# Patient Record
Sex: MALE | Race: BLACK OR AFRICAN AMERICAN | Employment: OTHER | URBAN - METROPOLITAN AREA
[De-identification: names, ages, dates, MRNs, and addresses within clinical notes are randomized per-mention and may not be internally consistent; named-entity substitution may affect disease eponyms.]

---

## 2023-09-21 ENCOUNTER — OFFICE VISIT (OUTPATIENT)
Age: 58
End: 2023-09-21
Payer: COMMERCIAL

## 2023-09-21 VITALS
HEART RATE: 74 BPM | DIASTOLIC BLOOD PRESSURE: 80 MMHG | RESPIRATION RATE: 18 BRPM | HEIGHT: 77 IN | SYSTOLIC BLOOD PRESSURE: 118 MMHG | OXYGEN SATURATION: 98 % | BODY MASS INDEX: 21.25 KG/M2 | WEIGHT: 180 LBS

## 2023-09-21 DIAGNOSIS — K40.90 UNILATERAL INGUINAL HERNIA WITHOUT OBSTRUCTION OR GANGRENE, RECURRENCE NOT SPECIFIED: Primary | ICD-10-CM

## 2023-09-21 DIAGNOSIS — R74.8 ELEVATED LIVER ENZYMES: ICD-10-CM

## 2023-09-21 DIAGNOSIS — R79.89 ABNORMAL CBC: ICD-10-CM

## 2023-09-21 PROCEDURE — 99203 OFFICE O/P NEW LOW 30 MIN: CPT | Performed by: INTERNAL MEDICINE

## 2023-09-21 RX ORDER — DILTIAZEM HYDROCHLORIDE 120 MG/1
120 CAPSULE, COATED, EXTENDED RELEASE ORAL DAILY
COMMUNITY
Start: 2023-08-23

## 2023-09-21 RX ORDER — PENICILLIN V POTASSIUM 500 MG/1
TABLET ORAL
COMMUNITY
Start: 2023-06-28

## 2023-09-21 RX ORDER — DILTIAZEM HYDROCHLORIDE 120 MG/1
120 TABLET, FILM COATED ORAL
COMMUNITY

## 2023-09-21 RX ORDER — LOSARTAN POTASSIUM 50 MG/1
TABLET ORAL
COMMUNITY
Start: 2023-09-20

## 2023-09-21 RX ORDER — METOPROLOL TARTRATE 50 MG/1
50 TABLET, FILM COATED ORAL 2 TIMES DAILY
COMMUNITY
Start: 2023-08-31

## 2023-09-21 RX ORDER — ASPIRIN 81 MG/1
81 TABLET ORAL
COMMUNITY

## 2023-09-21 RX ORDER — ZOLPIDEM TARTRATE 10 MG/1
TABLET ORAL
COMMUNITY
Start: 2023-08-31

## 2023-09-21 RX ORDER — PANTOPRAZOLE SODIUM 40 MG/1
TABLET, DELAYED RELEASE ORAL
COMMUNITY
Start: 2023-08-19

## 2023-09-21 RX ORDER — LORAZEPAM 0.5 MG/1
0.5 TABLET ORAL DAILY
COMMUNITY

## 2023-09-21 RX ORDER — CLOPIDOGREL BISULFATE 75 MG/1
75 TABLET ORAL
COMMUNITY

## 2023-09-21 NOTE — PROGRESS NOTES
[facility] - Outpatient Note  Mckay Salazar 66 y.o. male MRN: [id number]  Encounter: [id number]      ASSESSMENT AND PLAN:    Mckay Salazar is a 66 y.o. old pleasant male with PMH of gastric bypass in 3422 complicated by marginal ulcer, sleep apnea, atrial fibrillation status post Watchman (not on anticoagulation), hypertension, history of PUD who presents for consultation for hernia    Right-sided inguinal hernia-moderate-sized, reducible in the right inguinal region noted on physical exam.  No signs of incarceration. Intermittently has mild discomfort. · Will refer to general surgery Dr. Dinah Primrose for consideration of repair    Fatty liver, mild elevation of liver enzymes-noted on CAT scan last year at outside facility. No signs of cirrhosis on imaging labs or physical exam.  · Repeat liver enzymes  · Counseled on avoiding alcohol  · Fortunately patient is lost weight since this CAT scan  · Can consider ultrasound elastography in the future    History of marginal ulcer-this is first seen in 2014 and patient reports of bleeding ulcer last year. He was taken off his anticoagulation after Watchman was performed. He is otherwise stable with no signs of ulcer or bleeding. · Continue Protonix daily      Colon cancer screening-reports colonoscopy 2 years ago that was normal with repeat recommended in 2 years. · We will work to obtain his records      There are no diagnoses linked to this encounter.  ______________________________________________________________________    SUBJECTIVE: Patient states roughly 2 weeks ago he was lifting around the house doing chores and he felt a pop in his right inguinal region. He noticed a bulge that has intermittently been reducing. He has mild intermittent discomfort. This is never happened before. He otherwise denies any symptoms. No abdominal pain nausea vomiting constipation diarrhea. No fevers chills weight loss.   He has had no abdominal surgeries aside from his Nadege-en-Y gastric bypass. He inquires about surgical repair. His liver enzymes from 08/23/2023 show AST 39, ALT 35, alk phos 177 and T. bili 2.1  Patient had EGD in 2014 for PUD where his GJ anastomosis there was noted to be a 1 cm x 2 cm ulcer that is clean base. I reviewed prior external notes    I reviewed previous lab results and images      REVIEW OF SYSTEMS:     REVIEW OF ALL OTHER SYSTEMS IS OTHERWISE NEGATIVE. Historical Information   History reviewed. No pertinent past medical history. History reviewed. No pertinent surgical history. Social History   Social History     Substance and Sexual Activity   Alcohol Use Yes     Social History     Substance and Sexual Activity   Drug Use Not on file     Social History     Tobacco Use   Smoking Status Never   • Passive exposure: Never   Smokeless Tobacco Never     History reviewed. No pertinent family history. Meds/Allergies       Current Outpatient Medications:   •  aspirin (ECOTRIN LOW STRENGTH) 81 mg EC tablet  •  diltiazem (CARDIZEM CD) 120 mg 24 hr capsule  •  losartan (COZAAR) 50 mg tablet  •  metoprolol tartrate (LOPRESSOR) 50 mg tablet  •  pantoprazole (PROTONIX) 40 mg tablet  •  zolpidem (AMBIEN) 10 mg tablet  •  clopidogrel (PLAVIX) 75 mg tablet  •  diltiazem (CARDIZEM) 120 MG tablet  •  LORazepam (ATIVAN) 0.5 mg tablet  •  penicillin V potassium (VEETID) 500 mg tablet    No Known Allergies        Objective     Blood pressure 118/80, pulse 74, resp. rate 18, height 6' 5" (1.956 m), weight 81.6 kg (180 lb), SpO2 98 %. Body mass index is 21.34 kg/m². PHYSICAL EXAM:      General Appearance:   Alert, cooperative, no distress   HEENT:   Normocephalic, atraumatic, anicteric. Neck:  Supple, symmetrical, trachea midline   Lungs:   Clear to auscultation bilaterally; no rales, rhonchi or wheezing; respirations unlabored    Heart[de-identified]   Regular rate and rhythm; no murmur, rub, or gallop.    Abdomen:   Soft, non-tender, non-distended; normal bowel sounds; no masses, no organomegaly    Genitalia:   Deferred    Rectal:   Deferred    Extremities:  No cyanosis, clubbing or edema    Pulses:  2+ and symmetric    Skin:  No jaundice, rashes, or lesions    Lymph nodes:  No palpable cervical lymphadenopathy        Lab Results:   No visits with results within 1 Day(s) from this visit. Latest known visit with results is:   No results found for any previous visit. Radiology Results:   No results found.

## 2023-10-02 ENCOUNTER — CONSULT (OUTPATIENT)
Dept: SURGERY | Facility: CLINIC | Age: 58
End: 2023-10-02
Payer: COMMERCIAL

## 2023-10-02 VITALS
SYSTOLIC BLOOD PRESSURE: 121 MMHG | WEIGHT: 173.6 LBS | DIASTOLIC BLOOD PRESSURE: 86 MMHG | BODY MASS INDEX: 20.5 KG/M2 | TEMPERATURE: 97.4 F | HEIGHT: 77 IN

## 2023-10-02 DIAGNOSIS — K40.90 UNILATERAL INGUINAL HERNIA WITHOUT OBSTRUCTION OR GANGRENE, RECURRENCE NOT SPECIFIED: Primary | ICD-10-CM

## 2023-10-02 DIAGNOSIS — I48.91 A-FIB (HCC): ICD-10-CM

## 2023-10-02 DIAGNOSIS — Z76.89 ENCOUNTER TO ESTABLISH CARE: ICD-10-CM

## 2023-10-02 PROCEDURE — 99204 OFFICE O/P NEW MOD 45 MIN: CPT | Performed by: STUDENT IN AN ORGANIZED HEALTH CARE EDUCATION/TRAINING PROGRAM

## 2023-10-02 RX ORDER — CHLORHEXIDINE GLUCONATE 4 G/100ML
SOLUTION TOPICAL DAILY PRN
OUTPATIENT
Start: 2023-10-02

## 2023-10-02 RX ORDER — HEPARIN SODIUM 5000 [USP'U]/ML
5000 INJECTION, SOLUTION INTRAVENOUS; SUBCUTANEOUS ONCE
OUTPATIENT
Start: 2023-10-02 | End: 2023-10-02

## 2023-10-02 NOTE — PROGRESS NOTES
Assessment/Plan:  53-year-old male with reducible right inguinal hernia  -Patient presents due to right groin pain and bulge  -He recently moved to the area and noticed a twinge of pain in his right groin when moving  -Since that time he noticed a bulge that has slowly increased in size  -When he strains or goes to lift the bulge comes out more which causes him discomfort  -Patient also states he has been having some intermittent left groin twinges of pain no obvious bulge  -On exam there is a reducible right inguinal hernia, no obvious inguinal hernia on the left  -CBC and CMP from 8/23/2023 reviewed, this is in [address] [facility]  -CT scan of the abdomen and pelvis from 11/17/2022 report reviewed, this is in Care Everywhere  -Cardiac catheterization report from 2/8/2023 report reviewed, this is in [address] [facility]  -Cardiology note from 6/15/2023 reviewed, this is in [address] [facility] for laparoscopic right inguinal hernia repair with mesh, possible open, possible bilateral  -CBC, BMP ordered  -EKG ordered  -Request Cardiology risk stratification and optimization, at this time would recommend we obtain this from his [facility] Cardiologist since he recently underwent the Watchman procedure  -Referral placed for Family medicine to establish care  -Referral placed for Cardiology to establish care  -Patient notes he had a small amount of blood in his stool which does occur to him from time to time, recommend he follow-up with Gastroenterology for reevaluation    A visual was used to display the anatomy and the proposed incision, procedure, steps, and mesh placement. The risks were explained at length and outlined, not limited to bleeding, infection, recurrence, pain, testicular loss, and damage to other structures. The planned approximately one hour procedure was discussed along with 1 - 2 week recovery, resolution of pain and swelling timeline, and 4 weeks of light duty without lifting.  Questions were answered to satisfaction and consent was signed. 1. Unilateral inguinal hernia without obstruction or gangrene, recurrence not specified  -     Ambulatory Referral to General Surgery  -     Case request operating room: REPAIR HERNIA INGUINAL, LAPAROSCOPIC, Possible Open, Possible Bilateral; Standing  -     Basic metabolic panel; Future  -     CBC and Platelet; Future  -     EKG 12 lead; Future  -     Case request operating room: REPAIR HERNIA INGUINAL, LAPAROSCOPIC, Possible Open, Possible Bilateral    2. Encounter to establish care  -     Ambulatory Referral to [facility]; Future    3. [facility])  -     Ambulatory Referral to Cardiology; Future               Subjective:      Patient ID: Zack Galicia is a 66 y.o. male. Triage Notes:    12year-old male who presents the office for evaluation due to left groin pain and bulge. Patient has recently moved to the area and when he was moving boxes he noticed some discomfort in his right groin. Since that time he has noticed a bulge in his right groin that is slowly increased in size. He notes that it bulges out more when doing strenuous activity. He has been tolerating a diet and having bowel function. He states that he has noticed a small amount of blood in his stool which does occur to him from time to time. The following portions of the patient's history were reviewed and updated as appropriate:   He  has no past medical history on file. He   Patient Active Problem List    Diagnosis Date Noted   • Unilateral inguinal hernia without obstruction or gangrene 10/02/2023   • Encounter to establish care 10/02/2023   • [facility]) 10/02/2023     He  has no past surgical history on file. His family history is not on file. He  reports that he has never smoked. He has never been exposed to tobacco smoke. He has never used smokeless tobacco. He reports current alcohol use. He reports that he does not use drugs.   Current Outpatient Medications on File Prior to Visit Medication Sig   • aspirin (ECOTRIN LOW STRENGTH) 81 mg EC tablet Take 81 mg by mouth   • diltiazem (CARDIZEM CD) 120 mg 24 hr capsule Take 120 mg by mouth daily   • losartan (COZAAR) 50 mg tablet    • metoprolol tartrate (LOPRESSOR) 50 mg tablet Take 50 mg by mouth 2 (two) times a day   • pantoprazole (PROTONIX) 40 mg tablet    • zolpidem (AMBIEN) 10 mg tablet TAKE 1 TABLET BY MOUTH EVERY EVENING AS NEEDED FOR SLEEP   • clopidogrel (PLAVIX) 75 mg tablet Take 75 mg by mouth (Patient not taking: Reported on 9/21/2023)   • diltiazem (CARDIZEM) 120 MG tablet Take 120 mg by mouth (Patient not taking: Reported on 9/21/2023)   • LORazepam (ATIVAN) 0.5 mg tablet Take 0.5 mg by mouth daily (Patient not taking: Reported on 9/21/2023)   • penicillin V potassium (VEETID) 500 mg tablet  (Patient not taking: Reported on 9/21/2023)     No current facility-administered medications on file prior to visit. He has No Known Allergies. .    Review of Systems   Constitutional: Negative for chills, fatigue and fever. HENT: Negative for congestion, hearing loss, rhinorrhea and sore throat. Eyes: Negative for pain and discharge. Respiratory: Negative for cough, chest tightness and shortness of breath. Cardiovascular: Negative for chest pain and palpitations. Gastrointestinal: Positive for blood in stool. Negative for abdominal pain, constipation, diarrhea, nausea and vomiting. Positive right groin pain and bulge   Endocrine: Negative for cold intolerance and heat intolerance. Genitourinary: Negative for difficulty urinating and dysuria. Musculoskeletal: Negative for back pain and neck pain. Skin: Negative for color change and rash. Allergic/Immunologic: Negative for environmental allergies and food allergies. Neurological: Negative for seizures and headaches. Hematological: Negative for adenopathy. Does not bruise/bleed easily. Psychiatric/Behavioral: Negative for confusion and hallucinations. Objective:      /86 (BP Location: Left arm, Patient Position: Sitting, Cuff Size: Standard)   Temp (!) 97.4 °F (36.3 °C) (Temporal)   Ht 6' 5" (1.956 m)   Wt 78.7 kg (173 lb 9.6 oz)   BMI 20.59 kg/m²     Below is the patient's most recent value for Albumin, ALT, AST, BUN, Calcium, Chloride, Cholesterol, CO2, Creatinine, GFR, Glucose, HDL, Hematocrit, Hemoglobin, Hemoglobin A1C, LDL, Magnesium, Phosphorus, Platelets, Potassium, PSA, Sodium, Triglycerides, and WBC. No results found for: "ALT", "AST", "BUN", "CALCIUM", "CL", "CHOL", "CO2", "CREATININE", "CREAT", "GFRAA", "[place]", "HDL", "HCT", "HGB", "HGBA1C", "LDL", "MG", "PHOS", "PLT", "K", "PSA", "NA", "TRIG", "WBC"  Note: for a comprehensive list of the patient's lab results, access the Results Review activity. Physical Exam  Constitutional:       Appearance: Normal appearance. HENT:      Head: Normocephalic and atraumatic. Nose: Nose normal.   Eyes:      General: No scleral icterus. Conjunctiva/sclera: Conjunctivae normal.   Cardiovascular:      Rate and Rhythm: Normal rate and regular rhythm. Heart sounds: Normal heart sounds. Pulmonary:      Effort: Pulmonary effort is normal.      Breath sounds: Normal breath sounds. Abdominal:      General: There is no distension. Palpations: Abdomen is soft. Tenderness: There is no abdominal tenderness. Comments: Reducible right inguinal hernia, no obvious inguinal hernia on the left, patient examined in both standing and supine position   Musculoskeletal:         General: No signs of injury. Skin:     General: Skin is warm. Coloration: Skin is not jaundiced. Neurological:      General: No focal deficit present. Mental Status: He is alert and oriented to person, place, and time.    Psychiatric:         Mood and Affect: Mood normal.         Behavior: Behavior normal.

## 2023-10-02 NOTE — H&P (VIEW-ONLY)
Assessment/Plan:  61-year-old male with reducible right inguinal hernia  -Patient presents due to right groin pain and bulge  -He recently moved to the area and noticed a twinge of pain in his right groin when moving  -Since that time he noticed a bulge that has slowly increased in size  -When he strains or goes to lift the bulge comes out more which causes him discomfort  -Patient also states he has been having some intermittent left groin twinges of pain no obvious bulge  -On exam there is a reducible right inguinal hernia, no obvious inguinal hernia on the left  -CBC and CMP from 8/23/2023 reviewed, this is in [address] [facility]  -CT scan of the abdomen and pelvis from 11/17/2022 report reviewed, this is in Care Everywhere  -Cardiac catheterization report from 2/8/2023 report reviewed, this is in [address] [facility]  -Cardiology note from 6/15/2023 reviewed, this is in [address] [facility] for laparoscopic right inguinal hernia repair with mesh, possible open, possible bilateral  -CBC, BMP ordered  -EKG ordered  -Request Cardiology risk stratification and optimization, at this time would recommend we obtain this from his [facility] Cardiologist since he recently underwent the Watchman procedure  -Referral placed for Family medicine to establish care  -Referral placed for Cardiology to establish care  -Patient notes he had a small amount of blood in his stool which does occur to him from time to time, recommend he follow-up with Gastroenterology for reevaluation    A visual was used to display the anatomy and the proposed incision, procedure, steps, and mesh placement. The risks were explained at length and outlined, not limited to bleeding, infection, recurrence, pain, testicular loss, and damage to other structures. The planned approximately one hour procedure was discussed along with 1 - 2 week recovery, resolution of pain and swelling timeline, and 4 weeks of light duty without lifting.  Questions were answered to satisfaction and consent was signed. 1. Unilateral inguinal hernia without obstruction or gangrene, recurrence not specified  -     Ambulatory Referral to General Surgery  -     Case request operating room: REPAIR HERNIA INGUINAL, LAPAROSCOPIC, Possible Open, Possible Bilateral; Standing  -     Basic metabolic panel; Future  -     CBC and Platelet; Future  -     EKG 12 lead; Future  -     Case request operating room: REPAIR HERNIA INGUINAL, LAPAROSCOPIC, Possible Open, Possible Bilateral    2. Encounter to establish care  -     Ambulatory Referral to [facility]; Future    3. [facility])  -     Ambulatory Referral to Cardiology; Future               Subjective:      Patient ID: Gume Pickett is a 66 y.o. male. Triage Notes:    12year-old male who presents the office for evaluation due to left groin pain and bulge. Patient has recently moved to the area and when he was moving boxes he noticed some discomfort in his right groin. Since that time he has noticed a bulge in his right groin that is slowly increased in size. He notes that it bulges out more when doing strenuous activity. He has been tolerating a diet and having bowel function. He states that he has noticed a small amount of blood in his stool which does occur to him from time to time. The following portions of the patient's history were reviewed and updated as appropriate:   He  has no past medical history on file. He   Patient Active Problem List    Diagnosis Date Noted   • Unilateral inguinal hernia without obstruction or gangrene 10/02/2023   • Encounter to establish care 10/02/2023   • [facility]) 10/02/2023     He  has no past surgical history on file. His family history is not on file. He  reports that he has never smoked. He has never been exposed to tobacco smoke. He has never used smokeless tobacco. He reports current alcohol use. He reports that he does not use drugs.   Current Outpatient Medications on File Prior to Visit Medication Sig   • aspirin (ECOTRIN LOW STRENGTH) 81 mg EC tablet Take 81 mg by mouth   • diltiazem (CARDIZEM CD) 120 mg 24 hr capsule Take 120 mg by mouth daily   • losartan (COZAAR) 50 mg tablet    • metoprolol tartrate (LOPRESSOR) 50 mg tablet Take 50 mg by mouth 2 (two) times a day   • pantoprazole (PROTONIX) 40 mg tablet    • zolpidem (AMBIEN) 10 mg tablet TAKE 1 TABLET BY MOUTH EVERY EVENING AS NEEDED FOR SLEEP   • clopidogrel (PLAVIX) 75 mg tablet Take 75 mg by mouth (Patient not taking: Reported on 9/21/2023)   • diltiazem (CARDIZEM) 120 MG tablet Take 120 mg by mouth (Patient not taking: Reported on 9/21/2023)   • LORazepam (ATIVAN) 0.5 mg tablet Take 0.5 mg by mouth daily (Patient not taking: Reported on 9/21/2023)   • penicillin V potassium (VEETID) 500 mg tablet  (Patient not taking: Reported on 9/21/2023)     No current facility-administered medications on file prior to visit. He has No Known Allergies. .    Review of Systems   Constitutional: Negative for chills, fatigue and fever. HENT: Negative for congestion, hearing loss, rhinorrhea and sore throat. Eyes: Negative for pain and discharge. Respiratory: Negative for cough, chest tightness and shortness of breath. Cardiovascular: Negative for chest pain and palpitations. Gastrointestinal: Positive for blood in stool. Negative for abdominal pain, constipation, diarrhea, nausea and vomiting. Positive right groin pain and bulge   Endocrine: Negative for cold intolerance and heat intolerance. Genitourinary: Negative for difficulty urinating and dysuria. Musculoskeletal: Negative for back pain and neck pain. Skin: Negative for color change and rash. Allergic/Immunologic: Negative for environmental allergies and food allergies. Neurological: Negative for seizures and headaches. Hematological: Negative for adenopathy. Does not bruise/bleed easily. Psychiatric/Behavioral: Negative for confusion and hallucinations. Objective:      /86 (BP Location: Left arm, Patient Position: Sitting, Cuff Size: Standard)   Temp (!) 97.4 °F (36.3 °C) (Temporal)   Ht 6' 5" (1.956 m)   Wt 78.7 kg (173 lb 9.6 oz)   BMI 20.59 kg/m²     Below is the patient's most recent value for Albumin, ALT, AST, BUN, Calcium, Chloride, Cholesterol, CO2, Creatinine, GFR, Glucose, HDL, Hematocrit, Hemoglobin, Hemoglobin A1C, LDL, Magnesium, Phosphorus, Platelets, Potassium, PSA, Sodium, Triglycerides, and WBC. No results found for: "ALT", "AST", "BUN", "CALCIUM", "CL", "CHOL", "CO2", "CREATININE", "CREAT", "GFRAA", "[employer]", "HDL", "HCT", "HGB", "HGBA1C", "LDL", "MG", "PHOS", "PLT", "K", "PSA", "NA", "TRIG", "WBC"  Note: for a comprehensive list of the patient's lab results, access the Results Review activity. Physical Exam  Constitutional:       Appearance: Normal appearance. HENT:      Head: Normocephalic and atraumatic. Nose: Nose normal.   Eyes:      General: No scleral icterus. Conjunctiva/sclera: Conjunctivae normal.   Cardiovascular:      Rate and Rhythm: Normal rate and regular rhythm. Heart sounds: Normal heart sounds. Pulmonary:      Effort: Pulmonary effort is normal.      Breath sounds: Normal breath sounds. Abdominal:      General: There is no distension. Palpations: Abdomen is soft. Tenderness: There is no abdominal tenderness. Comments: Reducible right inguinal hernia, no obvious inguinal hernia on the left, patient examined in both standing and supine position   Musculoskeletal:         General: No signs of injury. Skin:     General: Skin is warm. Coloration: Skin is not jaundiced. Neurological:      General: No focal deficit present. Mental Status: He is alert and oriented to person, place, and time.    Psychiatric:         Mood and Affect: Mood normal.         Behavior: Behavior normal.

## 2023-10-04 ENCOUNTER — OFFICE VISIT (OUTPATIENT)
Dept: LAB | Facility: HOSPITAL | Age: 58
End: 2023-10-04
Payer: COMMERCIAL

## 2023-10-04 ENCOUNTER — APPOINTMENT (OUTPATIENT)
Dept: LAB | Facility: HOSPITAL | Age: 58
End: 2023-10-04
Payer: COMMERCIAL

## 2023-10-04 DIAGNOSIS — K40.90 UNILATERAL INGUINAL HERNIA WITHOUT OBSTRUCTION OR GANGRENE, RECURRENCE NOT SPECIFIED: ICD-10-CM

## 2023-10-04 LAB
ANION GAP SERPL CALCULATED.3IONS-SCNC: 5 MMOL/L
ATRIAL RATE: 80 BPM
BUN SERPL-MCNC: 13 MG/DL (ref 5–25)
CALCIUM SERPL-MCNC: 8.8 MG/DL (ref 8.4–10.2)
CHLORIDE SERPL-SCNC: 108 MMOL/L (ref 96–108)
CO2 SERPL-SCNC: 31 MMOL/L (ref 21–32)
CREAT SERPL-MCNC: 1.3 MG/DL (ref 0.6–1.3)
ERYTHROCYTE [DISTWIDTH] IN BLOOD BY AUTOMATED COUNT: 13.2 % (ref 11.6–15.1)
GFR SERPL CREATININE-BSD FRML MDRD: 60 ML/MIN/1.73SQ M
GLUCOSE P FAST SERPL-MCNC: 102 MG/DL (ref 65–99)
HCT VFR BLD AUTO: 38.7 % (ref 36.5–49.3)
HGB BLD-MCNC: 12.6 G/DL (ref 12–17)
MCH RBC QN AUTO: 32.4 PG (ref 26.8–34.3)
MCHC RBC AUTO-ENTMCNC: 32.6 G/DL (ref 31.4–37.4)
MCV RBC AUTO: 100 FL (ref 82–98)
P AXIS: 66 DEGREES
PLATELET # BLD AUTO: 255 THOUSANDS/UL (ref 149–390)
PMV BLD AUTO: 10 FL (ref 8.9–12.7)
POTASSIUM SERPL-SCNC: 5.3 MMOL/L (ref 3.5–5.3)
PR INTERVAL: 140 MS
QRS AXIS: 42 DEGREES
QRSD INTERVAL: 80 MS
QT INTERVAL: 390 MS
QTC INTERVAL: 449 MS
RBC # BLD AUTO: 3.89 MILLION/UL (ref 3.88–5.62)
SODIUM SERPL-SCNC: 144 MMOL/L (ref 135–147)
T WAVE AXIS: 50 DEGREES
VENTRICULAR RATE: 80 BPM
WBC # BLD AUTO: 3.91 THOUSAND/UL (ref 4.31–10.16)

## 2023-10-04 PROCEDURE — 36415 COLL VENOUS BLD VENIPUNCTURE: CPT

## 2023-10-04 PROCEDURE — 93010 ELECTROCARDIOGRAM REPORT: CPT | Performed by: INTERNAL MEDICINE

## 2023-10-04 PROCEDURE — 93005 ELECTROCARDIOGRAM TRACING: CPT

## 2023-10-04 PROCEDURE — 80048 BASIC METABOLIC PNL TOTAL CA: CPT

## 2023-10-04 PROCEDURE — 85027 COMPLETE CBC AUTOMATED: CPT

## 2023-10-06 ENCOUNTER — TELEPHONE (OUTPATIENT)
Dept: SURGERY | Facility: CLINIC | Age: 58
End: 2023-10-06

## 2023-10-06 NOTE — TELEPHONE ENCOUNTER
Spoke to patient stated I called his Cardiology office still no answer on if he is cleared for surgery and sent the clearance form to that office and haven't gotten word if patient is cleared. Patient stated he will call his Cardiology doctor on Monday to see what is going on and will call us back.

## 2023-10-06 NOTE — PRE-PROCEDURE INSTRUCTIONS
Pre-Surgery Instructions:   Medication Instructions   • aspirin (ECOTRIN LOW STRENGTH) 81 mg EC tablet Hold day of procedure as per surgeon   • diltiazem (CARDIZEM CD) 120 mg 24 hr capsule Take day of surgery. • losartan (COZAAR) 50 mg tablet Hold day of surgery. • metoprolol tartrate (LOPRESSOR) 50 mg tablet Take day of surgery. • pantoprazole (PROTONIX) 40 mg tablet Take day of surgery. • zolpidem (AMBIEN) 10 mg tablet Take night before surgery      Medication instructions for day surgery reviewed. Please use only a sip of water to take your instructed medications. Avoid all over the counter vitamins, supplements and NSAIDS for one week prior to surgery per anesthesia guidelines. Tylenol is ok to take as needed. You will receive a call one business day prior to surgery with an arrival time and hospital directions. If your surgery is scheduled on a Monday, the hospital will be calling you on the Friday prior to your surgery. If you have not heard from anyone by 8pm, please call the hospital supervisor through the hospital  at [phone]. Thor Paul [phone]). Do not eat or drink anything after midnight the night before your surgery, including candy, mints, lifesavers, or chewing gum. Do not drink alcohol 24hrs before your surgery. Try not to smoke at least 24hrs before your surgery. Follow the pre surgery showering instructions as listed in the [facility] Surgical Experience Booklet” or otherwise provided by your surgeon's office. Do not shave the surgical area 24 hours before surgery. Do not apply any lotions, creams, including makeup, cologne, deodorant, or perfumes after showering on the day of your surgery. No contact lenses, eye make-up, or artificial eyelashes. Remove nail polish, including gel polish, and any artificial, gel, or acrylic nails if possible. Remove all jewelry including rings and body piercing jewelry. Wear causal clothing that is easy to take on and off. Consider your type of surgery. Keep any valuables, jewelry, piercings at home. Please bring any specially ordered equipment (sling, braces) if indicated. Arrange for a responsible person to drive you to and from the hospital on the day of your surgery. Visitor Guidelines discussed. Call the surgeon's office with any new illnesses, exposures, or additional questions prior to surgery. Please reference your [facility] Surgical Experience Booklet” for additional information to prepare for your upcoming surgery.

## 2023-10-10 ENCOUNTER — TELEPHONE (OUTPATIENT)
Dept: SURGERY | Facility: CLINIC | Age: 58
End: 2023-10-10

## 2023-10-10 NOTE — TELEPHONE ENCOUNTER
Patient called back and I informed him that the clearance is required. I asked him to please f/u with the cardiologist office as well. I gave him our direct fax number for them to fax the clearance form. He stated he would give them a call. I told him I will f/u on his call to them as well and hopefully we can get the clearance in on time.

## 2023-10-10 NOTE — TELEPHONE ENCOUNTER
I called back and spoke with Elton Johnson at the cardiologist office on 10/10/23. She was very rude and short however stated the NP will review the patients chart who was last seen 06/2023 and if she can clear him she will if not have him come back into the office. I will call back to f/u.

## 2023-10-13 ENCOUNTER — TELEPHONE (OUTPATIENT)
Dept: SURGERY | Facility: CLINIC | Age: 58
End: 2023-10-13

## 2023-10-13 NOTE — TELEPHONE ENCOUNTER
Spoke to patient's cardiology office with Kaila Mendosa [phone] so they can fax over clearance again on patient's surgery that will be on the 18th of October.

## 2023-10-18 ENCOUNTER — HOSPITAL ENCOUNTER (OUTPATIENT)
Facility: HOSPITAL | Age: 58
Setting detail: OUTPATIENT SURGERY
Discharge: HOME | End: 2023-10-18
Attending: STUDENT IN AN ORGANIZED HEALTH CARE EDUCATION/TRAINING PROGRAM | Admitting: STUDENT IN AN ORGANIZED HEALTH CARE EDUCATION/TRAINING PROGRAM
Payer: COMMERCIAL

## 2023-10-18 ENCOUNTER — ANESTHESIA EVENT (OUTPATIENT)
Dept: PERIOP | Facility: HOSPITAL | Age: 58
End: 2023-10-18
Payer: COMMERCIAL

## 2023-10-18 ENCOUNTER — ANESTHESIA (OUTPATIENT)
Dept: PERIOP | Facility: HOSPITAL | Age: 58
End: 2023-10-18
Payer: COMMERCIAL

## 2023-10-18 VITALS
HEIGHT: 77 IN | HEART RATE: 85 BPM | TEMPERATURE: 97.9 F | SYSTOLIC BLOOD PRESSURE: 125 MMHG | BODY MASS INDEX: 20.04 KG/M2 | DIASTOLIC BLOOD PRESSURE: 81 MMHG | OXYGEN SATURATION: 100 % | WEIGHT: 169.75 LBS | RESPIRATION RATE: 17 BRPM

## 2023-10-18 DIAGNOSIS — K40.90 UNILATERAL INGUINAL HERNIA WITHOUT OBSTRUCTION OR GANGRENE, RECURRENCE NOT SPECIFIED: Primary | ICD-10-CM

## 2023-10-18 PROBLEM — G47.33 OBSTRUCTIVE SLEEP APNEA SYNDROME: Status: ACTIVE | Noted: 2023-10-18

## 2023-10-18 PROBLEM — M10.9 GOUT: Status: ACTIVE | Noted: 2023-10-18

## 2023-10-18 PROBLEM — I10 HTN (HYPERTENSION): Status: ACTIVE | Noted: 2023-10-18

## 2023-10-18 PROBLEM — Z98.84 H/O BARIATRIC SURGERY: Status: ACTIVE | Noted: 2023-10-18

## 2023-10-18 PROCEDURE — 49650 LAP ING HERNIA REPAIR INIT: CPT | Performed by: STUDENT IN AN ORGANIZED HEALTH CARE EDUCATION/TRAINING PROGRAM

## 2023-10-18 PROCEDURE — C1781 MESH (IMPLANTABLE): HCPCS | Performed by: STUDENT IN AN ORGANIZED HEALTH CARE EDUCATION/TRAINING PROGRAM

## 2023-10-18 PROCEDURE — 49650 LAP ING HERNIA REPAIR INIT: CPT | Performed by: PHYSICIAN ASSISTANT

## 2023-10-18 DEVICE — LAPAROSCOPIC SELF-FIXATING MESH POLYESTER WITH POLYLACTIC ACID GRIPS AND COLLAGEN FILM
Type: IMPLANTABLE DEVICE | Site: INGUINAL | Status: FUNCTIONAL
Brand: PROGRIP

## 2023-10-18 RX ORDER — KETOROLAC TROMETHAMINE 30 MG/ML
INJECTION, SOLUTION INTRAMUSCULAR; INTRAVENOUS AS NEEDED
Status: DISCONTINUED | OUTPATIENT
Start: 2023-10-18 | End: 2023-10-18

## 2023-10-18 RX ORDER — ACETAMINOPHEN 325 MG/1
650 TABLET ORAL EVERY 6 HOURS PRN
Status: DISCONTINUED | OUTPATIENT
Start: 2023-10-18 | End: 2023-10-19 | Stop reason: HOSPADM

## 2023-10-18 RX ORDER — TRAMADOL HYDROCHLORIDE 50 MG/1
50 TABLET ORAL EVERY 6 HOURS PRN
Qty: 16 TABLET | Refills: 0 | Status: SHIPPED | OUTPATIENT
Start: 2023-10-18 | End: 2023-10-28

## 2023-10-18 RX ORDER — HYDROMORPHONE HCL/PF 1 MG/ML
0.5 SYRINGE (ML) INJECTION
OUTPATIENT
Start: 2023-10-18

## 2023-10-18 RX ORDER — PROPOFOL 10 MG/ML
INJECTION, EMULSION INTRAVENOUS AS NEEDED
Status: DISCONTINUED | OUTPATIENT
Start: 2023-10-18 | End: 2023-10-18

## 2023-10-18 RX ORDER — TRAMADOL HYDROCHLORIDE 50 MG/1
50 TABLET ORAL EVERY 6 HOURS PRN
Status: DISCONTINUED | OUTPATIENT
Start: 2023-10-18 | End: 2023-10-19 | Stop reason: HOSPADM

## 2023-10-18 RX ORDER — CEFAZOLIN SODIUM 1 G/50ML
1000 SOLUTION INTRAVENOUS ONCE
Status: COMPLETED | OUTPATIENT
Start: 2023-10-18 | End: 2023-10-18

## 2023-10-18 RX ORDER — BUPIVACAINE HYDROCHLORIDE 2.5 MG/ML
INJECTION, SOLUTION EPIDURAL; INFILTRATION; INTRACAUDAL AS NEEDED
Status: DISCONTINUED | OUTPATIENT
Start: 2023-10-18 | End: 2023-10-18 | Stop reason: HOSPADM

## 2023-10-18 RX ORDER — HYDROMORPHONE HCL/PF 1 MG/ML
SYRINGE (ML) INJECTION AS NEEDED
Status: DISCONTINUED | OUTPATIENT
Start: 2023-10-18 | End: 2023-10-18

## 2023-10-18 RX ORDER — SODIUM CHLORIDE, SODIUM LACTATE, POTASSIUM CHLORIDE, CALCIUM CHLORIDE 600; 310; 30; 20 MG/100ML; MG/100ML; MG/100ML; MG/100ML
125 INJECTION, SOLUTION INTRAVENOUS CONTINUOUS
Status: DISCONTINUED | OUTPATIENT
Start: 2023-10-18 | End: 2023-10-19 | Stop reason: HOSPADM

## 2023-10-18 RX ORDER — ONDANSETRON 2 MG/ML
INJECTION INTRAMUSCULAR; INTRAVENOUS AS NEEDED
Status: DISCONTINUED | OUTPATIENT
Start: 2023-10-18 | End: 2023-10-18

## 2023-10-18 RX ORDER — NEOSTIGMINE METHYLSULFATE 1 MG/ML
INJECTION INTRAVENOUS AS NEEDED
Status: DISCONTINUED | OUTPATIENT
Start: 2023-10-18 | End: 2023-10-18

## 2023-10-18 RX ORDER — LIDOCAINE HYDROCHLORIDE 10 MG/ML
INJECTION, SOLUTION EPIDURAL; INFILTRATION; INTRACAUDAL; PERINEURAL AS NEEDED
Status: DISCONTINUED | OUTPATIENT
Start: 2023-10-18 | End: 2023-10-18

## 2023-10-18 RX ORDER — MAGNESIUM HYDROXIDE 1200 MG/15ML
LIQUID ORAL AS NEEDED
Status: DISCONTINUED | OUTPATIENT
Start: 2023-10-18 | End: 2023-10-18 | Stop reason: HOSPADM

## 2023-10-18 RX ORDER — FENTANYL CITRATE/PF 50 MCG/ML
50 SYRINGE (ML) INJECTION
OUTPATIENT
Start: 2023-10-18

## 2023-10-18 RX ORDER — MIDAZOLAM HYDROCHLORIDE 2 MG/2ML
INJECTION, SOLUTION INTRAMUSCULAR; INTRAVENOUS AS NEEDED
Status: DISCONTINUED | OUTPATIENT
Start: 2023-10-18 | End: 2023-10-18

## 2023-10-18 RX ORDER — ONDANSETRON 2 MG/ML
4 INJECTION INTRAMUSCULAR; INTRAVENOUS ONCE AS NEEDED
OUTPATIENT
Start: 2023-10-18

## 2023-10-18 RX ORDER — FENTANYL CITRATE 50 UG/ML
INJECTION, SOLUTION INTRAMUSCULAR; INTRAVENOUS AS NEEDED
Status: DISCONTINUED | OUTPATIENT
Start: 2023-10-18 | End: 2023-10-18

## 2023-10-18 RX ORDER — CHLORHEXIDINE GLUCONATE 4 G/100ML
SOLUTION TOPICAL DAILY PRN
Status: DISCONTINUED | OUTPATIENT
Start: 2023-10-18 | End: 2023-10-19 | Stop reason: HOSPADM

## 2023-10-18 RX ORDER — HEPARIN SODIUM 5000 [USP'U]/ML
5000 INJECTION, SOLUTION INTRAVENOUS; SUBCUTANEOUS ONCE
Status: COMPLETED | OUTPATIENT
Start: 2023-10-18 | End: 2023-10-18

## 2023-10-18 RX ORDER — ROCURONIUM BROMIDE 10 MG/ML
INJECTION, SOLUTION INTRAVENOUS AS NEEDED
Status: DISCONTINUED | OUTPATIENT
Start: 2023-10-18 | End: 2023-10-18

## 2023-10-18 RX ORDER — DEXAMETHASONE SODIUM PHOSPHATE 10 MG/ML
INJECTION, SOLUTION INTRAMUSCULAR; INTRAVENOUS AS NEEDED
Status: DISCONTINUED | OUTPATIENT
Start: 2023-10-18 | End: 2023-10-18

## 2023-10-18 RX ORDER — GLYCOPYRROLATE 0.2 MG/ML
INJECTION INTRAMUSCULAR; INTRAVENOUS AS NEEDED
Status: DISCONTINUED | OUTPATIENT
Start: 2023-10-18 | End: 2023-10-18

## 2023-10-18 RX ORDER — DOCUSATE SODIUM 100 MG/1
100 CAPSULE, LIQUID FILLED ORAL 3 TIMES DAILY PRN
Qty: 30 CAPSULE | Refills: 0 | Status: SHIPPED | OUTPATIENT
Start: 2023-10-18

## 2023-10-18 RX ADMIN — FENTANYL CITRATE 75 MCG: 50 INJECTION INTRAMUSCULAR; INTRAVENOUS at 12:11

## 2023-10-18 RX ADMIN — NEOSTIGMINE METHYLSULFATE 4 MG: 1 INJECTION INTRAVENOUS at 13:21

## 2023-10-18 RX ADMIN — HEPARIN SODIUM 5000 UNITS: 5000 INJECTION INTRAVENOUS; SUBCUTANEOUS at 11:41

## 2023-10-18 RX ADMIN — ONDANSETRON 4 MG: 2 INJECTION INTRAMUSCULAR; INTRAVENOUS at 13:15

## 2023-10-18 RX ADMIN — SODIUM CHLORIDE, SODIUM LACTATE, POTASSIUM CHLORIDE, AND CALCIUM CHLORIDE 125 ML/HR: .6; .31; .03; .02 INJECTION, SOLUTION INTRAVENOUS at 11:39

## 2023-10-18 RX ADMIN — LIDOCAINE HYDROCHLORIDE 50 MG: 10 INJECTION, SOLUTION EPIDURAL; INFILTRATION; INTRACAUDAL at 12:11

## 2023-10-18 RX ADMIN — KETOROLAC TROMETHAMINE 30 MG: 30 INJECTION, SOLUTION INTRAMUSCULAR; INTRAVENOUS at 13:15

## 2023-10-18 RX ADMIN — MIDAZOLAM 2 MG: 1 INJECTION INTRAMUSCULAR; INTRAVENOUS at 12:06

## 2023-10-18 RX ADMIN — HYDROMORPHONE HYDROCHLORIDE 0.25 MG: 1 INJECTION, SOLUTION INTRAMUSCULAR; INTRAVENOUS; SUBCUTANEOUS at 13:27

## 2023-10-18 RX ADMIN — PROPOFOL 180 MG: 10 INJECTION, EMULSION INTRAVENOUS at 12:11

## 2023-10-18 RX ADMIN — ROCURONIUM BROMIDE 10 MG: 10 INJECTION, SOLUTION INTRAVENOUS at 12:58

## 2023-10-18 RX ADMIN — FENTANYL CITRATE 25 MCG: 50 INJECTION INTRAMUSCULAR; INTRAVENOUS at 12:41

## 2023-10-18 RX ADMIN — GLYCOPYRROLATE 0.8 MG: 0.2 INJECTION, SOLUTION INTRAMUSCULAR; INTRAVENOUS at 13:21

## 2023-10-18 RX ADMIN — CEFAZOLIN SODIUM 2000 MG: 1 SOLUTION INTRAVENOUS at 12:25

## 2023-10-18 RX ADMIN — DEXAMETHASONE SODIUM PHOSPHATE 10 MG: 10 INJECTION, SOLUTION INTRAMUSCULAR; INTRAVENOUS at 12:12

## 2023-10-18 RX ADMIN — ROCURONIUM BROMIDE 40 MG: 10 INJECTION, SOLUTION INTRAVENOUS at 12:12

## 2023-10-18 NOTE — DISCHARGE INSTR - AVS FIRST PAGE
Your incisions are covered with Steri-Strips, 4 x 4 gauze, Tegaderm. In 2 days you may remove your dressing, the Steri-Strips will remain on your skin but the 4 x 4 gauze and Tegaderm can be removed. The Steri-Strips will fall off on their own. After your dressings are removed you may shower. Do not soak your incisions including tub baths or swimming. You may shower and let water and soap wash over incisions. Do not scrub your incisions. No heavy lifting greater than 15 lb for 4 weeks or until cleared by your Surgeon. Your scrotum may become swollen or bruised. This will resolve on its own. You may resume your normal diet as tolerated. Do not make any important decisions and do not drive while taking narcotic pain medication. You are prescribed Tramadol for severe pain. Take only as needed. Take Colace to soften your stool while taking narcotic pain medication. You can restart Plavix on 10/19/2023, per Cardiology recommendations. You may take Tylenol over-the-counter as needed for pain, follow instructions on the bottle. You may take Ibuprofen over-the-counter as needed for pain, follow instructions on the bottle. You may alternate Tylenol and Ibuprofen if needed, but do not take at the same time. Follow-up with your Surgeon in 2 weeks, call the office for an appointment. You will receive a survey via Email in regards to your same day surgery experience. Please fill out the survey to let us know how we did with your care.

## 2023-10-18 NOTE — OP NOTE
OPERATIVE REPORT  PATIENT NAME: Luis Higgins    :  1965  MRN: [id number]  Pt Location: [facility] OR ROOM 04    SURGERY DATE: 10/18/2023    Surgeon(s) and Role:     * Sanya Ramirez, DO - Primary     * Seferino Mcgregor PA-C - Assisting    Preop Diagnosis:  Unilateral inguinal hernia without obstruction or gangrene, recurrence not specified [K40.90]    Post-Op Diagnosis Codes:     * Unilateral inguinal hernia without obstruction or gangrene, recurrence not specified [K40.90]    Procedure(s):  Right - REPAIR HERNIA INGUINAL. LAPAROSCOPIC    Specimen(s):  * No specimens in log *    Estimated Blood Loss:   Minimal    Drains:  None    Anesthesia Type:   General    Operative Indications:  Unilateral inguinal hernia without obstruction or gangrene, recurrence not specified [K40.90]    Operative Findings: Moderate sized right indirect inguinal hernia  No inguinal hernia seen on the left    Complications:   None    Procedure and Technique:  The patient was seen again in Preoperative Holding. All the risks, benefits, complications, treatment options, and expected outcomes were discussed with the patient and family at length. All questions were answered to satisfaction. There was concurrence with the proposed plan and informed consent was obtained. The site of surgery was properly noted/marked. The patient was taken to Operating Room, identified, and the procedure verified as Right laparoscopic inguinal hernia repair with mesh, possible open, possible bilateral.    The patient was placed in the supine position on the operating room table. The patient had received preoperative antibiotics and SCDs placed on the bilateral lower extremities. Anesthesia was then induced and the patient was intubated. The abdomen was then prepped and draped in the usual sterile fashion using ChloraPrep.   A Time-Out was then performed with all involved present confirming the correct patient, procedure, antibiotics, and any additional concerns. A 1.5 centimeter supraumbilical incision was made in a transverse fashion using a #15 blade and the umbilical stalk was grasped and elevated. Using blunt dissection the fascia was exposed and was incised. Using a large blunt Indu clamp the peritoneum was entered under direct visualization. A 11 mm port was then placed in the fascial opening and pneumoperitoneum was established. Initial pressure upon establishing pneumoperitoneum was noted to be low. Two additional 5 mm ports were placed under direct visualization; one on the right side of the abdomen just above the umbilicus in the midclavicular line and one on the left side of the abdomen just above the umbilicus in the midclavicular line. The patient had a moderate-sized indirect right inguinal hernia. No inguinal hernia was seen on the left. The peritoneum overlying the Right groin was incised and reflected. The hernia sac was identified and carefully dissected from the cord structures without injuring them. The preperitoneal space was carefully expanded using blunt dissection in preparation for mesh placement. A 15 cm by 10 cm ProGrip mesh was marked for orientation and introduced into the abdomen via the 11 mm port. The mesh was introduced into the preperitoneal space and and spread evenly making sure to cover the hernia defect. The peritoneum was closed with running suture of 0 absorbable V-Loc with the Endo Stitch device. Hemostasis was noted. The abdomen was desufflated and the supraumbilical fascial incision was closed with 0 Vicryl in an interrupted fashion using 5 stitches. The abdomen was then reinsufflated and the fascial incision was inspected and noted to be hemostatic without any insufflation leakage. The remaining 5 mm ports were removed and the abdomen was desufflated. Local anesthesia infiltrated in the port sites using 0.25% Marcaine. The port sites were then closed using 4-0 Monocryl.   The abdomen was then cleansed and dried and Steri-Strips, 2 x 2, Tegaderm were used to cover the sites. All instrument, sponge, and needle counts were noted to be correct at the conclusion of the case. The patient was brought to the PACU in stable and satisfactory condition. I was present for the entire procedure., A qualified resident physician was not available. , and A physician assistant was required during the procedure for retraction, tissue handling, dissection and suturing.     Patient Disposition:  extubated and stable        SIGNATURE: Suhail De La Cruz DO  DATE: October 18, 2023  TIME: 1:24 PM

## 2023-10-18 NOTE — ANESTHESIA PREPROCEDURE EVALUATION
Procedure:  REPAIR HERNIA INGUINAL, LAPAROSCOPIC, Possible Open, Possible Bilateral (Right: Groin)    Relevant Problems   ANESTHESIA (within normal limits)      CARDIO  S/p Watchman procedure   (+) A-fib (HCC)   (+) HTN (hypertension)      GI/HEPATIC  Confirmed NPO appropriate  Gastric bypass ~8 years ago   (-) Gastroesophageal reflux disease      /RENAL (within normal limits)      HEMATOLOGY (within normal limits)      MUSCULOSKELETAL   (+) Gout      NEURO/PSYCH (within normal limits)      PULMONARY  Intermittently compliant with CPAP, discussed importance of CPAP in setting of IVELISSE after general anesthetic and in the setting of ongoing pain medication use   (+) Obstructive sleep apnea syndrome   (-) Smoking      Other   (+) Hx of gastric bypass        Physical Exam    Airway    Mallampati score: II  TM Distance: >3 FB  Neck ROM: full     Dental       Cardiovascular  Rate: normal    Pulmonary   Breath sounds clear to auscultation    Other Findings        Anesthesia Plan  ASA Score- 2     Anesthesia Type- general with ASA Monitors. Additional Monitors:     Airway Plan: ETT. Plan Factors-Exercise tolerance (METS): >4 METS. Chart reviewed. EKG reviewed. Existing labs reviewed. Patient is not a current smoker. Obstructive sleep apnea risk education given perioperatively. Induction- intravenous. Postoperative Plan- Plan for postoperative opioid use. Planned trial extubation    Informed Consent- Anesthetic plan and risks discussed with patient. I personally reviewed this patient with the CRNA. Discussed and agreed on the Anesthesia Plan with the CRNA. .            Lab Results   Component Value Date    WBC 3.91 (L) 10/04/2023    HGB 12.6 10/04/2023    HCT 38.7 10/04/2023     (H) 10/04/2023     10/04/2023   .   Lastp  Lab Results   Component Value Date    SODIUM 144 10/04/2023    K 5.3 10/04/2023     10/04/2023    CO2 31 10/04/2023    BUN 13 10/04/2023 CREATININE 1.30 10/04/2023    CALCIUM 8.8 10/04/2023     No results found for: "ALT", "AST", "GGT", "ALKPHOS", "BILITOT"  No results found for: "INR", "PROTIME"  No results found for: "HGBA1C"

## 2023-10-18 NOTE — INTERVAL H&P NOTE
H&P reviewed. After examining the patient I find no changes in the patients condition since the H&P had been written.     Vitals:    10/18/23 1132   BP: 124/87   Pulse: 89   Resp: 18   Temp: 97.5 °F (36.4 °C)   SpO2: 100%

## 2023-11-01 ENCOUNTER — OFFICE VISIT (OUTPATIENT)
Dept: SURGERY | Facility: CLINIC | Age: 58
End: 2023-11-01

## 2023-11-01 VITALS
WEIGHT: 177 LBS | DIASTOLIC BLOOD PRESSURE: 70 MMHG | TEMPERATURE: 97.7 F | RESPIRATION RATE: 18 BRPM | HEART RATE: 58 BPM | SYSTOLIC BLOOD PRESSURE: 118 MMHG | BODY MASS INDEX: 20.9 KG/M2 | OXYGEN SATURATION: 100 % | HEIGHT: 77 IN

## 2023-11-01 DIAGNOSIS — K40.90 UNILATERAL INGUINAL HERNIA WITHOUT OBSTRUCTION OR GANGRENE, RECURRENCE NOT SPECIFIED: Primary | ICD-10-CM

## 2023-11-01 PROCEDURE — 99024 POSTOP FOLLOW-UP VISIT: CPT | Performed by: SURGERY

## 2023-11-01 NOTE — PROGRESS NOTES
Post-Op Follow Up- General Surgery   Philly Tim 66 y.o. male MRN: [id number]  Unit/Bed#:  Encounter: [id number]    Assessment/Plan     Assessment:  Status post laparoscopic transabdominal right inguinal hernia repair with mesh, improving  Plan:  The patient is doing quite well from the surgical standpoint. The patient is a scheduled to see Dr. Mirian Mendez at the end of the month. History of Present Illness     HPI:  Philly Tim is a 66 y.o. male who presents to my office for first postop follow-up after laparoscopic transabdominal right inguinal hernia repair with mesh by Dr. Mirian Mendez from . He complains of some discomfort on the right groin, no bulge, no fever, no chills or any other constitutional symptoms.     Historical Information   Past Medical History:   Diagnosis Date    A-fib (HCC)     CPAP (continuous positive airway pressure) dependence     Sleep apnea      Past Surgical History:   Procedure Laterality Date    COLONOSCOPY      EGD      GASTRIC BYPASS      HERNIA REPAIR Right 10/18/2023    Procedure: REPAIR HERNIA INGUINAL, LAPAROSCOPIC;  Surgeon: Sony Black DO;  Location: [facility] OR;  Service: General    LEFT ATRIAL APPENDAGE OCCLUSION      Watchman     Social History   Social History     Substance and Sexual Activity   Alcohol Use Yes    Comment: wine 2x week     Social History     Substance and Sexual Activity   Drug Use Never     Social History     Tobacco Use   Smoking Status Former    Types: Cigarettes    Quit date:     Years since quittin.8    Passive exposure: Never   Smokeless Tobacco Never     Family History: non-contributory    Meds/Allergies   all medications and allergies reviewed     Current Outpatient Medications:     aspirin (ECOTRIN LOW STRENGTH) 81 mg EC tablet, Take 81 mg by mouth, Disp: , Rfl:     clopidogrel (PLAVIX) 75 mg tablet, Take 75 mg by mouth, Disp: , Rfl:     diltiazem (CARDIZEM CD) 120 mg 24 hr capsule, Take 120 mg by mouth daily, Disp: , Rfl:     losartan (COZAAR) 50 mg tablet, , Disp: , Rfl:     metoprolol tartrate (LOPRESSOR) 50 mg tablet, Take 50 mg by mouth 2 (two) times a day, Disp: , Rfl:     pantoprazole (PROTONIX) 40 mg tablet, , Disp: , Rfl:     zolpidem (AMBIEN) 10 mg tablet, TAKE 1 TABLET BY MOUTH EVERY EVENING AS NEEDED FOR SLEEP, Disp: , Rfl:     diltiazem (CARDIZEM) 120 MG tablet, Take 120 mg by mouth (Patient not taking: Reported on 9/21/2023), Disp: , Rfl:     docusate sodium (COLACE) 100 mg capsule, Take 1 capsule (100 mg total) by mouth 3 (three) times a day as needed for constipation (while taking narcotic pain medication) (Patient not taking: Reported on 11/1/2023), Disp: 30 capsule, Rfl: 0    LORazepam (ATIVAN) 0.5 mg tablet, Take 0.5 mg by mouth daily (Patient not taking: Reported on 9/21/2023), Disp: , Rfl:     penicillin V potassium (VEETID) 500 mg tablet, , Disp: , Rfl:   No Known Allergies    Objective     Current Vitals:   Blood Pressure: 118/70 (11/01/23 1301)  Pulse: 58 (11/01/23 1301)  Temperature: 97.7 °F (36.5 °C) (11/01/23 1301)  Respirations: 18 (11/01/23 1301)  Height: 6' 5" (195.6 cm) (11/01/23 1301)  Weight - Scale: 80.3 kg (177 lb) (11/01/23 1301)  SpO2: 100 % (11/01/23 1301)    Physical Exam  Vitals and nursing note reviewed. Abdominal:      Comments: Abdomen is soft, nondistended and nontender. Incisions are well-healed without evidence of infection. There is no recurrence of the right inguinal hernia. Some ecchymosis around the umbilicus without evidence of hematoma.

## 2023-11-27 ENCOUNTER — OFFICE VISIT (OUTPATIENT)
Dept: SURGERY | Facility: CLINIC | Age: 58
End: 2023-11-27
Payer: COMMERCIAL

## 2023-11-27 VITALS
SYSTOLIC BLOOD PRESSURE: 118 MMHG | OXYGEN SATURATION: 99 % | BODY MASS INDEX: 20.8 KG/M2 | HEART RATE: 76 BPM | DIASTOLIC BLOOD PRESSURE: 80 MMHG | TEMPERATURE: 97.8 F | HEIGHT: 77 IN | RESPIRATION RATE: 18 BRPM | WEIGHT: 176.2 LBS

## 2023-11-27 DIAGNOSIS — K40.90 UNILATERAL INGUINAL HERNIA WITHOUT OBSTRUCTION OR GANGRENE, RECURRENCE NOT SPECIFIED: Primary | ICD-10-CM

## 2023-11-27 PROCEDURE — 99213 OFFICE O/P EST LOW 20 MIN: CPT | Performed by: STUDENT IN AN ORGANIZED HEALTH CARE EDUCATION/TRAINING PROGRAM

## 2023-11-27 NOTE — PROGRESS NOTES
Assessment/Plan:  28-year-old male status post laparoscopic right inguinal hernia repair with mesh on 10/18/2023  -Patient denies any abdominal pain  -He is tolerating a diet well and having normal bowel function  -States he had a twinge of pain in his left groin a few days ago but this resolved on its own, denies any left groin bulge  -On exam laparoscopic incisions healing well without erythema induration or drainage, no signs of hernia recurrence in the right groin, no bulge or hernia noted in the left groin  -Patient has no restrictions at this time, can slowly increase heavy lifting  -Follow-up in office as needed       1. Unilateral inguinal hernia without obstruction or gangrene, recurrence not specified               Subjective:      Patient ID: Ricardo Ceron is a 66 y.o. male. Triage Notes:    Patient is a 28-year-old male who presents to office for evaluation status post laparoscopic right inguinal hernia repair with mesh. He is tolerating a diet and having normal bowel function. He denies any abdominal pain. He does state he noticed a twinge of pain in his left groin which resolved on its own. Denies noticing any bulge in the left groin. The following portions of the patient's history were reviewed and updated as appropriate: allergies, current medications, past family history, past medical history, past social history, past surgical history and problem list.    Review of Systems   Constitutional:  Negative for chills, fatigue and fever. HENT:  Negative for congestion, hearing loss, rhinorrhea and sore throat. Eyes:  Negative for pain and discharge. Respiratory:  Negative for cough, chest tightness and shortness of breath. Cardiovascular:  Negative for chest pain and palpitations. Gastrointestinal:  Negative for abdominal pain, constipation, diarrhea, nausea and vomiting. Endocrine: Negative for cold intolerance and heat intolerance.    Genitourinary:  Negative for difficulty urinating and dysuria. Musculoskeletal:  Negative for back pain and neck pain. Skin:  Negative for color change and rash. Allergic/Immunologic: Negative for environmental allergies and food allergies. Neurological:  Negative for seizures and headaches. Hematological:  Negative for adenopathy. Does not bruise/bleed easily. Psychiatric/Behavioral:  Negative for confusion and hallucinations. Objective:      /80 (BP Location: Left arm, Patient Position: Sitting, Cuff Size: Standard)   Pulse 76   Temp 97.8 °F (36.6 °C) (Temporal)   Resp 18   Ht 6' 5" (1.956 m)   Wt 79.9 kg (176 lb 3.2 oz)   SpO2 99%   BMI 20.89 kg/m²     Below is the patient's most recent value for Albumin, ALT, AST, BUN, Calcium, Chloride, Cholesterol, CO2, Creatinine, GFR, Glucose, HDL, Hematocrit, Hemoglobin, Hemoglobin A1C, LDL, Magnesium, Phosphorus, Platelets, Potassium, PSA, Sodium, Triglycerides, and WBC. Lab Results   Component Value Date    BUN 13 10/04/2023    CALCIUM 8.8 10/04/2023     10/04/2023    CO2 31 10/04/2023    CREATININE 1.30 10/04/2023    HCT 38.7 10/04/2023    HGB 12.6 10/04/2023     10/04/2023    K 5.3 10/04/2023    WBC 3.91 (L) 10/04/2023     Note: for a comprehensive list of the patient's lab results, access the Results Review activity. Physical Exam  Constitutional:       Appearance: Normal appearance. HENT:      Head: Normocephalic and atraumatic. Nose: Nose normal.   Eyes:      General: No scleral icterus. Conjunctiva/sclera: Conjunctivae normal.   Cardiovascular:      Rate and Rhythm: Normal rate. Pulmonary:      Effort: Pulmonary effort is normal.   Abdominal:      General: There is no distension. Palpations: Abdomen is soft. Tenderness: There is no abdominal tenderness.       Comments: Laparoscopic incisions healing well without erythema induration or drainage, no signs of hernia recurrence in the right groin, no palpated bulge in the left groin Musculoskeletal:         General: No signs of injury. Skin:     General: Skin is warm. Coloration: Skin is not jaundiced. Neurological:      General: No focal deficit present. Mental Status: He is alert and oriented to person, place, and time.    Psychiatric:         Mood and Affect: Mood normal.         Behavior: Behavior normal.

## 2023-12-14 ENCOUNTER — OFFICE VISIT (OUTPATIENT)
Age: 58
End: 2023-12-14
Payer: COMMERCIAL

## 2023-12-14 VITALS
HEART RATE: 66 BPM | WEIGHT: 175 LBS | BODY MASS INDEX: 20.66 KG/M2 | SYSTOLIC BLOOD PRESSURE: 100 MMHG | DIASTOLIC BLOOD PRESSURE: 80 MMHG | OXYGEN SATURATION: 97 % | HEIGHT: 77 IN

## 2023-12-14 DIAGNOSIS — R63.4 UNINTENTIONAL WEIGHT LOSS: ICD-10-CM

## 2023-12-14 DIAGNOSIS — K76.0 FATTY LIVER: Primary | ICD-10-CM

## 2023-12-14 DIAGNOSIS — K52.9 CHRONIC DIARRHEA: ICD-10-CM

## 2023-12-14 DIAGNOSIS — R53.83 OTHER FATIGUE: ICD-10-CM

## 2023-12-14 PROCEDURE — 99214 OFFICE O/P EST MOD 30 MIN: CPT | Performed by: INTERNAL MEDICINE

## 2023-12-14 RX ORDER — PANCRELIPASE 36000; 180000; 114000 [USP'U]/1; [USP'U]/1; [USP'U]/1
CAPSULE, DELAYED RELEASE PELLETS ORAL
COMMUNITY
Start: 2023-11-07

## 2023-12-14 NOTE — PATIENT INSTRUCTIONS
Scheduled date of EGD/colonoscopy (as of today): 12/29/23  Physician performing EGD/colonoscopy: Yecenia  Location of EGD/colonoscopy: [facility]  Desired bowel prep reviewed with patient: Miralax  Instructions reviewed with patient by: Chet BEE  Clearances:

## 2023-12-14 NOTE — PROGRESS NOTES
[facility] - Outpatient Note  Chi Durham 66 y.o. male MRN: [id number]  Encounter: [id number]      ASSESSMENT AND PLAN:      Chi Durham is a 66 y.o. old pleasant male with PMH of gastric bypass in 7136 complicated by marginal ulcer, history of cholecystectomy, sleep apnea, atrial fibrillation status post Watchman (not on anticoagulation), hypertension, history of PUD who presents for consultation for hernia    Unintentional Weight Loss, history of colon polyps-patient reporting 20 pound weight loss in the past several months of unclear etiology. CT of the abdomen 2022 was unremarkable except for fatty liver. Patient reports colonoscopy 2 to 3 years ago and states he is due for colonoscopy. States he has had polyps in the past but unsure if he had polyps in the most recent one. Plan for EGD and colonoscopy  Consider CT chest of the abdomen and pelvis in the future    Chronic diarrhea-patient morning 3-4 loose bowel movements per day for 1+ years. This may be in the setting of Nadege-en-Y gastric bypass versus bile acid induced diarrhea from cholecystectomy versus IBS versus other causes. Check stool studies  EGD and colonoscopy as above. Will biopsy for H. pylori celiac disease and microscopic colitis. Consider bile acid binder in the future  Continue Creon that he was prescribed at outside hospital however will consider increasing in the future depending on fecal elastase. He is currently on 36,000 units per meal.  He takes this 3-4 times per day. Alcohol use-patient reports drinking every day either a bottle of wine or 3-4 shots of liquor. I suspect this is playing a role with his GI issues. Counseled on alcohol cessation  Check liver enzymes  Check right upper quadrant ultrasound     Right-sided inguinal hernia-status post inguinal hernia repair by Dr. Pako Horvath  He is doing well from inguinal hernia standpoint and has no pain or symptoms.      Fatty liver, mild elevation of liver enzymes-noted on CAT scan last year at outside facility. Suspect due to alcohol use as he is a daily drinker as above. Check right upper quadrant ultrasound  Repeat liver enzymes  Counseled on avoiding alcohol  Fortunately patient is lost weight since this CAT scan  Can consider ultrasound elastography in the future           There are no diagnoses linked to this encounter.  ______________________________________________________________________    SUBJECTIVE: Patient reporting multiple complaints however his biggest complaint is unable to gain weight. He states he is unintentionally lost 20 pounds in the past several months despite eating a lot of food. He states he also has chronic diarrhea for at least a year occurring 3-4 times per day described as loose and brown. No overt bleeding. He has not tried anything for this except Creon which was prescribed at outside hospital in [facility]. He is unsure about his last EGD and colonoscopy but thinks it was 2 to 3 years ago and states he was due for colonoscopy in 1 or 2 years? He is unsure why though he states he had polyps in the past and possibly may have had a poor bowel prep? Is unclear at this time. I reviewed prior external notes    I reviewed previous lab results and images      REVIEW OF SYSTEMS:     REVIEW OF ALL OTHER SYSTEMS IS OTHERWISE NEGATIVE.       Historical Information   Past Medical History:   Diagnosis Date    A-fib ([address])     CPAP (continuous positive airway pressure) dependence     Sleep apnea      Past Surgical History:   Procedure Laterality Date    COLONOSCOPY      EGD      GASTRIC BYPASS      HERNIA REPAIR Right 10/18/2023    Procedure: REPAIR HERNIA INGUINAL, LAPAROSCOPIC;  Surgeon: Allison Lucero DO;  Location: [facility];  Service: General    LEFT ATRIAL APPENDAGE OCCLUSION      Watchman     Social History   Social History     Substance and Sexual Activity   Alcohol Use Yes    Comment: wine 2x week Social History     Substance and Sexual Activity   Drug Use Never     Social History     Tobacco Use   Smoking Status Former    Current packs/day: 0.00    Types: Cigarettes    Quit date: 2015    Years since quittin.9    Passive exposure: Never   Smokeless Tobacco Never     History reviewed. No pertinent family history. Meds/Allergies       Current Outpatient Medications:     aspirin (ECOTRIN LOW STRENGTH) 81 mg EC tablet    Creon 24276-110677 units CPEP    diltiazem (CARDIZEM CD) 120 mg 24 hr capsule    docusate sodium (COLACE) 100 mg capsule    LORazepam (ATIVAN) 0.5 mg tablet    losartan (COZAAR) 50 mg tablet    metoprolol tartrate (LOPRESSOR) 50 mg tablet    pantoprazole (PROTONIX) 40 mg tablet    zolpidem (AMBIEN) 10 mg tablet    diltiazem (CARDIZEM) 120 MG tablet    penicillin V potassium (VEETID) 500 mg tablet    No Known Allergies        Objective     Blood pressure 100/80, pulse 66, height 6' 5" (1.956 m), weight 79.4 kg (175 lb), SpO2 97%. Body mass index is 20.75 kg/m². PHYSICAL EXAM:      General Appearance:   Alert, cooperative, no distress   HEENT:   Normocephalic, atraumatic, anicteric. Neck:  Supple, symmetrical, trachea midline   Lungs:   Clear to auscultation bilaterally; no rales, rhonchi or wheezing; respirations unlabored    Heart[de-identified]   Regular rate and rhythm; no murmur, rub, or gallop. Abdomen:   Soft, non-tender, non-distended; normal bowel sounds; no masses, no organomegaly    Genitalia:   Deferred    Rectal:   Deferred    Extremities:  No cyanosis, clubbing or edema    Pulses:  2+ and symmetric    Skin:  No jaundice, rashes, or lesions    Lymph nodes:  No palpable cervical lymphadenopathy        Lab Results:   No visits with results within 1 Day(s) from this visit.    Latest known visit with results is:   Office Visit on 10/04/2023   Component Date Value    Ventricular Rate 10/04/2023 80     Atrial Rate 10/04/2023 80     MN Interval 10/04/2023 140     QRSD Interval 10/04/2023 80     QT Interval 10/04/2023 390     QTC Interval 10/04/2023 449     P Axis 10/04/2023 66     QRS Letcher 10/04/2023 42     T Wave Letcher 10/04/2023 50          Radiology Results:   No results found.

## 2023-12-14 NOTE — H&P (VIEW-ONLY)
[facility] - Outpatient Note  Jurgen Rocha 58 y.o. male MRN: [id number]  Encounter: [id number]      ASSESSMENT AND PLAN:      Jurgen Rocha is a 58 y.o. old pleasant male with PMH of gastric bypass in 2008 complicated by marginal ulcer, history of cholecystectomy, sleep apnea, atrial fibrillation status post Watchman (not on anticoagulation), hypertension, history of PUD who presents for consultation for hernia    Unintentional Weight Loss, history of colon polyps-patient reporting 20 pound weight loss in the past several months of unclear etiology.  CT of the abdomen 2022 was unremarkable except for fatty liver.  Patient reports colonoscopy 2 to 3 years ago and states he is due for colonoscopy.  States he has had polyps in the past but unsure if he had polyps in the most recent one.  Plan for EGD and colonoscopy  Consider CT chest of the abdomen and pelvis in the future    Chronic diarrhea-patient morning 3-4 loose bowel movements per day for 1+ years.  This may be in the setting of Nadege-en-Y gastric bypass versus bile acid induced diarrhea from cholecystectomy versus IBS versus other causes.  Check stool studies  EGD and colonoscopy as above.  Will biopsy for H. pylori celiac disease and microscopic colitis.  Consider bile acid binder in the future  Continue Creon that he was prescribed at outside hospital however will consider increasing in the future depending on fecal elastase.  He is currently on 36,000 units per meal.  He takes this 3-4 times per day.    Alcohol use-patient reports drinking every day either a bottle of wine or 3-4 shots of liquor.  I suspect this is playing a role with his GI issues.  Counseled on alcohol cessation  Check liver enzymes  Check right upper quadrant ultrasound     Right-sided inguinal hernia-status post inguinal hernia repair by Dr. Pillai  He is doing well from inguinal hernia standpoint and has no pain or symptoms.     Fatty liver, mild  elevation of liver enzymes-noted on CAT scan last year at outside facility.  Suspect due to alcohol use as he is a daily drinker as above.  Check right upper quadrant ultrasound  Repeat liver enzymes  Counseled on avoiding alcohol  Fortunately patient is lost weight since this CAT scan  Can consider ultrasound elastography in the future           There are no diagnoses linked to this encounter.  ______________________________________________________________________    SUBJECTIVE: Patient reporting multiple complaints however his biggest complaint is unable to gain weight.  He states he is unintentionally lost 20 pounds in the past several months despite eating a lot of food.  He states he also has chronic diarrhea for at least a year occurring 3-4 times per day described as loose and brown.  No overt bleeding.  He has not tried anything for this except Creon which was prescribed at outside hospital in [place].  He is unsure about his last EGD and colonoscopy but thinks it was 2 to 3 years ago and states he was due for colonoscopy in 1 or 2 years?  He is unsure why though he states he had polyps in the past and possibly may have had a poor bowel prep?  Is unclear at this time.      I reviewed prior external notes    I reviewed previous lab results and images      REVIEW OF SYSTEMS:     REVIEW OF ALL OTHER SYSTEMS IS OTHERWISE NEGATIVE.      Historical Information   Past Medical History:   Diagnosis Date    A-fib (HCC)     CPAP (continuous positive airway pressure) dependence     Sleep apnea      Past Surgical History:   Procedure Laterality Date    COLONOSCOPY      EGD      GASTRIC BYPASS      HERNIA REPAIR Right 10/18/2023    Procedure: REPAIR HERNIA INGUINAL, LAPAROSCOPIC;  Surgeon: Chris Fonseca DO;  Location: [facility] OR;  Service: General    LEFT ATRIAL APPENDAGE OCCLUSION      Watchman     Social History   Social History     Substance and Sexual Activity   Alcohol Use Yes    Comment: wine 2x week  "    Social History     Substance and Sexual Activity   Drug Use Never     Social History     Tobacco Use   Smoking Status Former    Current packs/day: 0.00    Types: Cigarettes    Quit date: 2015    Years since quittin.9    Passive exposure: Never   Smokeless Tobacco Never     History reviewed. No pertinent family history.    Meds/Allergies       Current Outpatient Medications:     aspirin (ECOTRIN LOW STRENGTH) 81 mg EC tablet    Creon [id number] units CPEP    diltiazem (CARDIZEM CD) 120 mg 24 hr capsule    docusate sodium (COLACE) 100 mg capsule    LORazepam (ATIVAN) 0.5 mg tablet    losartan (COZAAR) 50 mg tablet    metoprolol tartrate (LOPRESSOR) 50 mg tablet    pantoprazole (PROTONIX) 40 mg tablet    zolpidem (AMBIEN) 10 mg tablet    diltiazem (CARDIZEM) 120 MG tablet    penicillin V potassium (VEETID) 500 mg tablet    No Known Allergies        Objective     Blood pressure 100/80, pulse 66, height 6' 5\" (1.956 m), weight 79.4 kg (175 lb), SpO2 97%. Body mass index is 20.75 kg/m².      PHYSICAL EXAM:      General Appearance:   Alert, cooperative, no distress   HEENT:   Normocephalic, atraumatic, anicteric.     Neck:  Supple, symmetrical, trachea midline   Lungs:   Clear to auscultation bilaterally; no rales, rhonchi or wheezing; respirations unlabored    Heart::   Regular rate and rhythm; no murmur, rub, or gallop.   Abdomen:   Soft, non-tender, non-distended; normal bowel sounds; no masses, no organomegaly    Genitalia:   Deferred    Rectal:   Deferred    Extremities:  No cyanosis, clubbing or edema    Pulses:  2+ and symmetric    Skin:  No jaundice, rashes, or lesions    Lymph nodes:  No palpable cervical lymphadenopathy        Lab Results:   No visits with results within 1 Day(s) from this visit.   Latest known visit with results is:   Office Visit on 10/04/2023   Component Date Value    Ventricular Rate 10/04/2023 80     Atrial Rate 10/04/2023 80     VT Interval 10/04/2023 140     QRSD Interval " 10/04/2023 80     QT Interval 10/04/2023 390     QTC Interval 10/04/2023 449     P Axis 10/04/2023 66     QRS Stambaugh 10/04/2023 42     T Wave Stambaugh 10/04/2023 50          Radiology Results:   No results found.

## 2023-12-19 ENCOUNTER — APPOINTMENT (OUTPATIENT)
Dept: LAB | Facility: CLINIC | Age: 58
End: 2023-12-19
Payer: COMMERCIAL

## 2023-12-19 DIAGNOSIS — R74.8 ELEVATED LIVER ENZYMES: ICD-10-CM

## 2023-12-19 DIAGNOSIS — R53.83 OTHER FATIGUE: ICD-10-CM

## 2023-12-19 DIAGNOSIS — K52.9 CHRONIC DIARRHEA: ICD-10-CM

## 2023-12-19 LAB
ALBUMIN SERPL BCP-MCNC: 3.9 G/DL (ref 3.5–5)
ALP SERPL-CCNC: 106 U/L (ref 34–104)
ALT SERPL W P-5'-P-CCNC: 25 U/L (ref 7–52)
AST SERPL W P-5'-P-CCNC: 29 U/L (ref 13–39)
BILIRUB DIRECT SERPL-MCNC: 0.29 MG/DL (ref 0–0.2)
BILIRUB SERPL-MCNC: 0.8 MG/DL (ref 0.2–1)
FERRITIN SERPL-MCNC: 63 NG/ML (ref 24–336)
IRON SATN MFR SERPL: 32 % (ref 15–50)
IRON SERPL-MCNC: 70 UG/DL (ref 50–212)
PROT SERPL-MCNC: 6.5 G/DL (ref 6.4–8.4)
TIBC SERPL-MCNC: 219 UG/DL (ref 250–450)
TSH SERPL DL<=0.05 MIU/L-ACNC: 0.58 UIU/ML (ref 0.45–4.5)
UIBC SERPL-MCNC: 149 UG/DL (ref 155–355)

## 2023-12-19 PROCEDURE — 82728 ASSAY OF FERRITIN: CPT

## 2023-12-19 PROCEDURE — 83540 ASSAY OF IRON: CPT

## 2023-12-19 PROCEDURE — 82784 ASSAY IGA/IGD/IGG/IGM EACH: CPT

## 2023-12-19 PROCEDURE — 80076 HEPATIC FUNCTION PANEL: CPT

## 2023-12-19 PROCEDURE — 86231 EMA EACH IG CLASS: CPT

## 2023-12-19 PROCEDURE — 86258 DGP ANTIBODY EACH IG CLASS: CPT

## 2023-12-19 PROCEDURE — 84443 ASSAY THYROID STIM HORMONE: CPT

## 2023-12-19 PROCEDURE — 36415 COLL VENOUS BLD VENIPUNCTURE: CPT

## 2023-12-19 PROCEDURE — 86364 TISS TRNSGLTMNASE EA IG CLAS: CPT

## 2023-12-19 PROCEDURE — 83550 IRON BINDING TEST: CPT

## 2023-12-20 ENCOUNTER — APPOINTMENT (OUTPATIENT)
Dept: LAB | Facility: CLINIC | Age: 58
End: 2023-12-20
Payer: COMMERCIAL

## 2023-12-20 DIAGNOSIS — K52.9 CHRONIC DIARRHEA: ICD-10-CM

## 2023-12-20 LAB
ENDOMYSIUM IGA SER QL: NEGATIVE
GLIADIN PEPTIDE IGA SER-ACNC: 8 UNITS (ref 0–19)
GLIADIN PEPTIDE IGG SER-ACNC: 3 UNITS (ref 0–19)
IGA SERPL-MCNC: 410 MG/DL (ref 90–386)
TTG IGA SER-ACNC: <2 U/ML (ref 0–3)
TTG IGG SER-ACNC: <2 U/ML (ref 0–5)

## 2023-12-20 PROCEDURE — 87505 NFCT AGENT DETECTION GI: CPT

## 2023-12-20 PROCEDURE — 87209 SMEAR COMPLEX STAIN: CPT

## 2023-12-20 PROCEDURE — 87177 OVA AND PARASITES SMEARS: CPT

## 2023-12-20 PROCEDURE — 82653 EL-1 FECAL QUANTITATIVE: CPT

## 2023-12-20 PROCEDURE — 83993 ASSAY FOR CALPROTECTIN FECAL: CPT

## 2023-12-21 LAB
C DIFF TOX GENS STL QL NAA+PROBE: NEGATIVE
CAMPYLOBACTER DNA SPEC NAA+PROBE: NORMAL
SALMONELLA DNA SPEC QL NAA+PROBE: NORMAL
SHIGA TOXIN STX GENE SPEC NAA+PROBE: NORMAL
SHIGELLA DNA SPEC QL NAA+PROBE: NORMAL

## 2023-12-24 LAB
G LAMBLIA AG STL QL IA: NEGATIVE
O+P STL CONC: NORMAL

## 2023-12-26 LAB — ELASTASE PANC STL-MCNT: <50 UG ELAST./G

## 2023-12-28 ENCOUNTER — OFFICE VISIT (OUTPATIENT)
Dept: FAMILY MEDICINE CLINIC | Facility: CLINIC | Age: 58
End: 2023-12-28
Payer: COMMERCIAL

## 2023-12-28 VITALS
HEART RATE: 81 BPM | DIASTOLIC BLOOD PRESSURE: 88 MMHG | OXYGEN SATURATION: 100 % | WEIGHT: 176.6 LBS | HEIGHT: 77 IN | TEMPERATURE: 97.9 F | BODY MASS INDEX: 20.85 KG/M2 | SYSTOLIC BLOOD PRESSURE: 110 MMHG | RESPIRATION RATE: 14 BRPM

## 2023-12-28 DIAGNOSIS — I10 PRIMARY HYPERTENSION: ICD-10-CM

## 2023-12-28 DIAGNOSIS — F51.01 PRIMARY INSOMNIA: ICD-10-CM

## 2023-12-28 DIAGNOSIS — Z11.4 ENCOUNTER FOR SCREENING FOR HIV: ICD-10-CM

## 2023-12-28 DIAGNOSIS — Z11.59 NEED FOR HEPATITIS C SCREENING TEST: ICD-10-CM

## 2023-12-28 DIAGNOSIS — D50.9 IRON DEFICIENCY ANEMIA, UNSPECIFIED IRON DEFICIENCY ANEMIA TYPE: ICD-10-CM

## 2023-12-28 DIAGNOSIS — Z76.89 ENCOUNTER TO ESTABLISH CARE: Primary | ICD-10-CM

## 2023-12-28 DIAGNOSIS — F43.10 PTSD (POST-TRAUMATIC STRESS DISORDER): ICD-10-CM

## 2023-12-28 DIAGNOSIS — Z12.5 PROSTATE CANCER SCREENING: ICD-10-CM

## 2023-12-28 PROCEDURE — 99203 OFFICE O/P NEW LOW 30 MIN: CPT | Performed by: NURSE PRACTITIONER

## 2023-12-28 RX ORDER — ZOLPIDEM TARTRATE 10 MG/1
10 TABLET ORAL
Qty: 30 TABLET | Refills: 0 | Status: SHIPPED | OUTPATIENT
Start: 2023-12-28

## 2023-12-28 RX ORDER — METOPROLOL TARTRATE 50 MG/1
50 TABLET, FILM COATED ORAL 2 TIMES DAILY
Qty: 90 TABLET | Refills: 1 | Status: SHIPPED | OUTPATIENT
Start: 2023-12-28

## 2023-12-28 RX ORDER — LOSARTAN POTASSIUM 50 MG/1
50 TABLET ORAL DAILY
Qty: 90 TABLET | Refills: 3 | Status: SHIPPED | OUTPATIENT
Start: 2023-12-28

## 2023-12-28 RX ORDER — DILTIAZEM HYDROCHLORIDE 120 MG/1
120 CAPSULE, COATED, EXTENDED RELEASE ORAL DAILY
Qty: 90 CAPSULE | Refills: 1 | Status: SHIPPED | OUTPATIENT
Start: 2023-12-28

## 2023-12-28 RX ORDER — LORAZEPAM 0.5 MG/1
0.5 TABLET ORAL DAILY
Qty: 30 TABLET | Refills: 0 | Status: SHIPPED | OUTPATIENT
Start: 2023-12-28
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
## 2023-12-29 ENCOUNTER — ANESTHESIA EVENT (OUTPATIENT)
Dept: GASTROENTEROLOGY | Facility: HOSPITAL | Age: 58
End: 2023-12-29

## 2023-12-29 ENCOUNTER — HOSPITAL ENCOUNTER (OUTPATIENT)
Dept: GASTROENTEROLOGY | Facility: HOSPITAL | Age: 58
Setting detail: OUTPATIENT SURGERY
End: 2023-12-29
Attending: INTERNAL MEDICINE
Payer: COMMERCIAL

## 2023-12-29 ENCOUNTER — ANESTHESIA (OUTPATIENT)
Dept: GASTROENTEROLOGY | Facility: HOSPITAL | Age: 58
End: 2023-12-29

## 2023-12-29 VITALS
BODY MASS INDEX: 20.82 KG/M2 | WEIGHT: 176.37 LBS | HEART RATE: 57 BPM | SYSTOLIC BLOOD PRESSURE: 119 MMHG | RESPIRATION RATE: 16 BRPM | HEIGHT: 77 IN | DIASTOLIC BLOOD PRESSURE: 86 MMHG | TEMPERATURE: 97.9 F | OXYGEN SATURATION: 100 %

## 2023-12-29 DIAGNOSIS — Z90.49 HISTORY OF CHOLECYSTECTOMY: Primary | ICD-10-CM

## 2023-12-29 DIAGNOSIS — R63.4 UNINTENTIONAL WEIGHT LOSS: ICD-10-CM

## 2023-12-29 PROCEDURE — 43239 EGD BIOPSY SINGLE/MULTIPLE: CPT | Performed by: INTERNAL MEDICINE

## 2023-12-29 PROCEDURE — 45380 COLONOSCOPY AND BIOPSY: CPT | Performed by: INTERNAL MEDICINE

## 2023-12-29 PROCEDURE — 88305 TISSUE EXAM BY PATHOLOGIST: CPT | Performed by: PATHOLOGY

## 2023-12-29 PROCEDURE — 45385 COLONOSCOPY W/LESION REMOVAL: CPT | Performed by: INTERNAL MEDICINE

## 2023-12-29 RX ORDER — LIDOCAINE HYDROCHLORIDE 20 MG/ML
INJECTION, SOLUTION EPIDURAL; INFILTRATION; INTRACAUDAL; PERINEURAL AS NEEDED
Status: DISCONTINUED | OUTPATIENT
Start: 2023-12-29 | End: 2023-12-29

## 2023-12-29 RX ORDER — GLYCOPYRROLATE 0.2 MG/ML
INJECTION INTRAMUSCULAR; INTRAVENOUS AS NEEDED
Status: DISCONTINUED | OUTPATIENT
Start: 2023-12-29 | End: 2023-12-29

## 2023-12-29 RX ORDER — SODIUM CHLORIDE, SODIUM LACTATE, POTASSIUM CHLORIDE, CALCIUM CHLORIDE 600; 310; 30; 20 MG/100ML; MG/100ML; MG/100ML; MG/100ML
INJECTION, SOLUTION INTRAVENOUS CONTINUOUS PRN
Status: DISCONTINUED | OUTPATIENT
Start: 2023-12-29 | End: 2023-12-29

## 2023-12-29 RX ORDER — PROPOFOL 10 MG/ML
INJECTION, EMULSION INTRAVENOUS AS NEEDED
Status: DISCONTINUED | OUTPATIENT
Start: 2023-12-29 | End: 2023-12-29

## 2023-12-29 RX ORDER — CHOLESTYRAMINE 4 G/9G
1 POWDER, FOR SUSPENSION ORAL 2 TIMES DAILY WITH MEALS
Qty: 60 PACKET | Refills: 3 | Status: SHIPPED | OUTPATIENT
Start: 2023-12-29

## 2023-12-29 RX ADMIN — PROPOFOL 200 MG: 10 INJECTION, EMULSION INTRAVENOUS at 08:14

## 2023-12-29 RX ADMIN — PROPOFOL 50 MG: 10 INJECTION, EMULSION INTRAVENOUS at 08:21

## 2023-12-29 RX ADMIN — GLYCOPYRROLATE 0.1 MCG: 0.2 INJECTION, SOLUTION INTRAMUSCULAR; INTRAVENOUS at 08:10

## 2023-12-29 RX ADMIN — LIDOCAINE HYDROCHLORIDE 100 MG: 20 INJECTION, SOLUTION EPIDURAL; INFILTRATION; INTRACAUDAL; PERINEURAL at 08:14

## 2023-12-29 RX ADMIN — SODIUM CHLORIDE, SODIUM LACTATE, POTASSIUM CHLORIDE, AND CALCIUM CHLORIDE: .6; .31; .03; .02 INJECTION, SOLUTION INTRAVENOUS at 07:41

## 2023-12-29 RX ADMIN — Medication 40 MG: at 08:28

## 2023-12-29 RX ADMIN — PROPOFOL 50 MG: 10 INJECTION, EMULSION INTRAVENOUS at 08:28

## 2023-12-29 RX ADMIN — PROPOFOL 50 MG: 10 INJECTION, EMULSION INTRAVENOUS at 08:24

## 2023-12-29 RX ADMIN — PROPOFOL 50 MG: 10 INJECTION, EMULSION INTRAVENOUS at 08:18

## 2023-12-29 NOTE — INTERVAL H&P NOTE
H&P reviewed. After examining the patient I find no changes in the patients condition since the H&P had been written.    Vitals:    12/29/23 0716   BP: 129/89   Pulse: 60   Resp: 18   Temp: (!) 97.4 °F (36.3 °C)   SpO2: 100%

## 2023-12-29 NOTE — ANESTHESIA PREPROCEDURE EVALUATION
Procedure:  EGD  COLONOSCOPY    Relevant Problems   ANESTHESIA (within normal limits)      CARDIO  S/p Watchman procedure   (+) A-fib (HCC)   (+) HTN (hypertension)      GI/HEPATIC  Confirmed NPO appropriate  Gastric bypass ~8 years ago   (-) Gastroesophageal reflux disease      /RENAL (within normal limits)      HEMATOLOGY (within normal limits)      MUSCULOSKELETAL   (+) Gout      NEURO/PSYCH (within normal limits)      PULMONARY  Intermittently compliant with CPAP, discussed importance of CPAP in setting of IVELISSE after general anesthetic and in the setting of ongoing pain medication use   (+) Obstructive sleep apnea syndrome   (-) Smoking        Physical Exam    Airway    Mallampati score: II  TM Distance: >3 FB  Neck ROM: full     Dental   No notable dental hx     Cardiovascular  Rate: normal    Pulmonary   Breath sounds clear to auscultation    Other Findings        Anesthesia Plan  ASA Score- 2     Anesthesia Type- IV sedation with anesthesia with ASA Monitors.         Additional Monitors:     Airway Plan:     Comment: I have seen the patient and reviewed the history.  Patient to receive IV sedation with full ASA monitors.  Risks discussed with the patient, consent signed.  I have seen the patient and reviewed the history.  Patient to receive IV sedation with full ASA monitors.  Risks discussed with the patient, consent signed.  .       Plan Factors-Exercise tolerance (METS): >4 METS.    Chart reviewed.    Patient summary reviewed.    Patient is not a current smoker.      Obstructive sleep apnea risk education given perioperatively.        Induction- intravenous.    Postoperative Plan-     Informed Consent- Anesthetic plan and risks discussed with patient.  I personally reviewed this patient with the CRNA. Discussed and agreed on the Anesthesia Plan with the CRNA..            Lab Results   Component Value Date    WBC 3.91 (L) 10/04/2023    HGB 12.6 10/04/2023    HCT 38.7 10/04/2023     (H) 10/04/2023     " 10/04/2023   .  Lastbmp  Lab Results   Component Value Date    SODIUM 144 10/04/2023    K 5.3 10/04/2023     10/04/2023    CO2 31 10/04/2023    BUN 13 10/04/2023    CREATININE 1.30 10/04/2023    CALCIUM 8.8 10/04/2023     Lab Results   Component Value Date    ALT 25 12/19/2023    AST 29 12/19/2023    ALKPHOS 106 (H) 12/19/2023     No results found for: \"INR\", \"PROTIME\"  No results found for: \"HGBA1C\"        "

## 2023-12-29 NOTE — ANESTHESIA POSTPROCEDURE EVALUATION
Post-Op Assessment Note    CV Status:  Stable  Pain Score: 0    Pain management: adequate       Mental Status:  Alert   Hydration Status:  Stable   PONV Controlled:  None   Airway Patency:  Patent     Post Op Vitals Reviewed: Yes                  /68 (12/29/23 0840)    Temp 97.9 °F (36.6 °C) (12/29/23 0840)    Pulse 61 (12/29/23 0840)   Resp 16 (12/29/23 0840)    SpO2 100 % (12/29/23 0840)

## 2024-01-03 ENCOUNTER — APPOINTMENT (OUTPATIENT)
Dept: LAB | Facility: CLINIC | Age: 59
End: 2024-01-03
Payer: COMMERCIAL

## 2024-01-03 DIAGNOSIS — I10 PRIMARY HYPERTENSION: ICD-10-CM

## 2024-01-03 DIAGNOSIS — Z11.4 ENCOUNTER FOR SCREENING FOR HIV: ICD-10-CM

## 2024-01-03 DIAGNOSIS — D50.9 IRON DEFICIENCY ANEMIA, UNSPECIFIED IRON DEFICIENCY ANEMIA TYPE: ICD-10-CM

## 2024-01-03 DIAGNOSIS — Z11.59 NEED FOR HEPATITIS C SCREENING TEST: ICD-10-CM

## 2024-01-03 DIAGNOSIS — Z12.5 PROSTATE CANCER SCREENING: ICD-10-CM

## 2024-01-03 LAB
ALBUMIN SERPL BCP-MCNC: 3.9 G/DL (ref 3.5–5)
ALP SERPL-CCNC: 100 U/L (ref 34–104)
ALT SERPL W P-5'-P-CCNC: 26 U/L (ref 7–52)
ANION GAP SERPL CALCULATED.3IONS-SCNC: 9 MMOL/L
AST SERPL W P-5'-P-CCNC: 35 U/L (ref 13–39)
BILIRUB SERPL-MCNC: 1.48 MG/DL (ref 0.2–1)
BUN SERPL-MCNC: 12 MG/DL (ref 5–25)
CALCIUM SERPL-MCNC: 8.5 MG/DL (ref 8.4–10.2)
CHLORIDE SERPL-SCNC: 109 MMOL/L (ref 96–108)
CO2 SERPL-SCNC: 24 MMOL/L (ref 21–32)
CREAT SERPL-MCNC: 1.1 MG/DL (ref 0.6–1.3)
FERRITIN SERPL-MCNC: 81 NG/ML (ref 24–336)
GFR SERPL CREATININE-BSD FRML MDRD: 74 ML/MIN/1.73SQ M
GLUCOSE P FAST SERPL-MCNC: 99 MG/DL (ref 65–99)
IRON SATN MFR SERPL: 20 % (ref 15–50)
IRON SERPL-MCNC: 39 UG/DL (ref 50–212)
POTASSIUM SERPL-SCNC: 4.2 MMOL/L (ref 3.5–5.3)
PROT SERPL-MCNC: 6.3 G/DL (ref 6.4–8.4)
PSA SERPL-MCNC: 0.37 NG/ML (ref 0–4)
SODIUM SERPL-SCNC: 142 MMOL/L (ref 135–147)
TIBC SERPL-MCNC: 196 UG/DL (ref 250–450)
UIBC SERPL-MCNC: 157 UG/DL (ref 155–355)

## 2024-01-03 PROCEDURE — 36415 COLL VENOUS BLD VENIPUNCTURE: CPT

## 2024-01-03 PROCEDURE — G0103 PSA SCREENING: HCPCS

## 2024-01-03 PROCEDURE — 83550 IRON BINDING TEST: CPT

## 2024-01-03 PROCEDURE — 80053 COMPREHEN METABOLIC PANEL: CPT

## 2024-01-03 PROCEDURE — 82728 ASSAY OF FERRITIN: CPT

## 2024-01-03 PROCEDURE — 86803 HEPATITIS C AB TEST: CPT

## 2024-01-03 PROCEDURE — 83540 ASSAY OF IRON: CPT

## 2024-01-03 PROCEDURE — 87389 HIV-1 AG W/HIV-1&-2 AB AG IA: CPT

## 2024-01-04 LAB
HCV AB SER QL: NORMAL
HIV 1+2 AB+HIV1 P24 AG SERPL QL IA: NORMAL
HIV 2 AB SERPL QL IA: NORMAL
HIV1 AB SERPL QL IA: NORMAL
HIV1 P24 AG SERPL QL IA: NORMAL

## 2024-01-05 PROCEDURE — 88305 TISSUE EXAM BY PATHOLOGIST: CPT | Performed by: PATHOLOGY

## 2024-01-11 ENCOUNTER — OFFICE VISIT (OUTPATIENT)
Dept: FAMILY MEDICINE CLINIC | Facility: CLINIC | Age: 59
End: 2024-01-11
Payer: COMMERCIAL

## 2024-01-11 VITALS
SYSTOLIC BLOOD PRESSURE: 94 MMHG | HEART RATE: 78 BPM | TEMPERATURE: 97.9 F | HEIGHT: 77 IN | BODY MASS INDEX: 21.13 KG/M2 | WEIGHT: 179 LBS | RESPIRATION RATE: 16 BRPM | DIASTOLIC BLOOD PRESSURE: 60 MMHG | OXYGEN SATURATION: 98 %

## 2024-01-11 DIAGNOSIS — I48.20 CHRONIC ATRIAL FIBRILLATION (HCC): ICD-10-CM

## 2024-01-11 DIAGNOSIS — R79.89 ABNORMAL CBC: ICD-10-CM

## 2024-01-11 DIAGNOSIS — R17 ELEVATED BILIRUBIN: ICD-10-CM

## 2024-01-11 DIAGNOSIS — Z23 IMMUNIZATION DUE: ICD-10-CM

## 2024-01-11 DIAGNOSIS — D50.9 IRON DEFICIENCY ANEMIA, UNSPECIFIED IRON DEFICIENCY ANEMIA TYPE: ICD-10-CM

## 2024-01-11 DIAGNOSIS — D69.6 THROMBOCYTOPENIA (HCC): ICD-10-CM

## 2024-01-11 DIAGNOSIS — Z00.00 ANNUAL PHYSICAL EXAM: Primary | ICD-10-CM

## 2024-01-11 DIAGNOSIS — Z98.84 HX OF GASTRIC BYPASS: ICD-10-CM

## 2024-01-11 LAB — CALPROTECTIN STL-MCNT: NORMAL UG/G

## 2024-01-11 PROCEDURE — 99396 PREV VISIT EST AGE 40-64: CPT | Performed by: NURSE PRACTITIONER

## 2024-01-11 NOTE — ASSESSMENT & PLAN NOTE
Annual physical completed.  Blood work reviewed.  Discussed increasing intake of iron rich foods.  Patient does have follow-up with cardiology.  Has been taking Lopressor Cardizem.  Has history of A-fib.  Blood pressure is at goal.  Today patient is a little hypotensive.  Discussed stopping Cozaar, checking blood pressure after 1 hour after taking Cardizem and Lopressor.  Goal of blood pressure is 130/80 if it increases to 140s over 90 may restart Cozaar.

## 2024-01-11 NOTE — PROGRESS NOTES
ADULT ANNUAL PHYSICAL  [facility] PRIMARY CARE    NAME: Jurgen Rocha  AGE: 58 y.o. SEX: male  : 1965     DATE: 2024     Assessment and Plan:     Problem List Items Addressed This Visit        Cardiovascular and Mediastinum    A-fib (HCC)     Patient is taking Lopressor Cardizem and aspirin.  Has follow-up with cardiology.  Denies any palpitations, shortness of breath            Other    Hx of gastric bypass     Patient has history of gastric bypass.  Discussed maintaining good nutrition.  Patient iron level is low discussed increasing iron intake.  Will recheck levels in 3 months         Annual physical exam - Primary     Annual physical completed.  Blood work reviewed.  Discussed increasing intake of iron rich foods.  Patient does have follow-up with cardiology.  Has been taking Lopressor Cardizem.  Has history of A-fib.  Blood pressure is at goal.  Today patient is a little hypotensive.  Discussed stopping Cozaar, checking blood pressure after 1 hour after taking Cardizem and Lopressor.  Goal of blood pressure is 130/80 if it increases to 140s over 90 may restart Cozaar.         Elevated bilirubin    Relevant Orders    Comprehensive metabolic panel    Abnormal CBC     She has a history of abnormal CBC, increased platelet.  Has recently stopped drinking alcohol.  Will repeat levels if continues to have leukopenia thrombocytosis will refer to oncology        Other Visit Diagnoses     Iron deficiency anemia, unspecified iron deficiency anemia type        Relevant Orders    Iron Panel (Includes Ferritin, Iron Sat%, Iron, and TIBC)    Immunization due        Thrombocytopenia (HCC)        Relevant Orders    CBC and differential            Immunizations and preventive care screenings were discussed with patient today. Appropriate education was printed on patient's after visit summary.    Discussed risks and benefits of prostate cancer screening. We  discussed the controversial history of PSA screening for prostate cancer in the [place] as well as the risk of over detection and over treatment of prostate cancer by way of PSA screening.  The patient understands that PSA blood testing is an imperfect way to screen for prostate cancer and that elevated PSA levels in the blood may also be caused by infection, inflammation, prostatic trauma or manipulation, urological procedures, or by benign prostatic enlargement.    The role of the digital rectal examination in prostate cancer screening was also discussed and I discussed with him that there is large interobserver variability in the findings of digital rectal examination.    Counseling:  Alcohol/drug use: discussed moderation in alcohol intake, the recommendations for healthy alcohol use, and avoidance of illicit drug use.  Dental Health: discussed importance of regular tooth brushing, flossing, and dental visits.  Injury prevention: discussed safety/seat belts, safety helmets, smoke detectors, carbon dioxide detectors, and smoking near bedding or upholstery.  Sexual health: discussed sexually transmitted diseases, partner selection, use of condoms, avoidance of unintended pregnancy, and contraceptive alternatives.  Exercise: the importance of regular exercise/physical activity was discussed. Recommend exercise 3-5 times per week for at least 30 minutes.          No follow-ups on file.     Chief Complaint:     Chief Complaint   Patient presents with   • Follow-up      History of Present Illness:     Adult Annual Physical   Patient here for a comprehensive physical exam. The patient reports problems - weight loss, unintended .    Diet and Physical Activity  Diet/Nutrition: well balanced diet.   Exercise: walking.      Depression Screening  PHQ-2/9 Depression Screening         General Health  Sleep: sleeps well.   Hearing: normal - bilateral.  Vision: no vision problems and most recent eye exam >1 year ago.    Dental: regular dental visits.        Health  Symptoms include: none    Advanced Care Planning  Do you have an advanced directive? no  Do you have a durable medical power of ? no     Review of Systems:     Review of Systems   Constitutional: Negative.    HENT: Negative.     Eyes: Negative.    Respiratory: Negative.     Cardiovascular: Negative.    Gastrointestinal: Negative.  Negative for abdominal distention.   Endocrine: Negative.    Genitourinary: Negative.    Musculoskeletal: Negative.    Skin: Negative.    Allergic/Immunologic: Negative.    Neurological: Negative.    Hematological: Negative.    Psychiatric/Behavioral: Negative.  The patient is not nervous/anxious.       Past Medical History:     Past Medical History:   Diagnosis Date   • A-fib ([facility])    • Anemia 10 2019   • Clotting disorder (HCC) 10 2019   • CPAP (continuous positive airway pressure) dependence    • Sleep apnea    • Visual impairment 2020      Past Surgical History:     Past Surgical History:   Procedure Laterality Date   • CHOLECYSTECTOMY      Gastric bypass   • COLONOSCOPY     • EGD     • GASTRIC BYPASS     • HERNIA REPAIR Right 10/18/2023    Procedure: REPAIR HERNIA INGUINAL, LAPAROSCOPIC;  Surgeon: Chris Fonseca DO;  Location: [facility];  Service: General   • LEFT ATRIAL APPENDAGE OCCLUSION      Watchman      Family History:     History reviewed. No pertinent family history.   Social History:     Social History     Socioeconomic History   • Marital status: /Civil Union     Spouse name: None   • Number of children: None   • Years of education: None   • Highest education level: None   Occupational History   • None   Tobacco Use   • Smoking status: Former     Current packs/day: 0.00     Types: Cigars, Cigarettes     Start date:      Quit date: 2018     Years since quittin.5     Passive exposure: Never   • Smokeless tobacco: Never   Vaping Use   • Vaping status: Never Used   Substance and Sexual  Activity   • Alcohol use: Not Currently     Comment: wine 2x week   • Drug use: Not Currently     Types: Marijuana   • Sexual activity: Yes     Partners: Female     Birth control/protection: Male Sterilization   Other Topics Concern   • None   Social History Narrative   • None     Social Determinants of Health     Financial Resource Strain: Not on file   Food Insecurity: Low Risk  (2/8/2023)    Received from [facility]    Food Insecurity    • Within the past 12 months, the food you bought just didn’t last and you didn’t have money to get more.: Never true    • Within the past 12 months, you worried that your food would run out before you got money to buy more.: Never true   Transportation Needs: Not on file   Physical Activity: Not on file   Stress: Not on file   Social Connections: Not on file   Intimate Partner Violence: Not on file   Housing Stability: Low Risk  (2/8/2023)    Received from [facility]    Housing Stability    • Are you worried or concerned that in the next two months you may not have stable housing that you own, rent, or stay in as a part of a household?: No    • Think about the place you live. Do you have problems with any of the following? (check all that apply): None of the above      Current Medications:     Current Outpatient Medications   Medication Sig Dispense Refill   • aspirin (ECOTRIN LOW STRENGTH) 81 mg EC tablet Take 81 mg by mouth     • cholestyramine (QUESTRAN) 4 g packet Take 1 packet (4 g total) by mouth 2 (two) times a day with meals 60 packet 3   • Creon 80455-774323 units CPEP TAKE ONE CAPSULE BY MOUTH THREE TIMES DAILY BEFORE A MEAL AND SNACKS     • diltiazem (CARDIZEM CD) 120 mg 24 hr capsule Take 1 capsule (120 mg total) by mouth daily 90 capsule 1   • docusate sodium (COLACE) 100 mg capsule Take 1 capsule (100 mg total) by mouth 3 (three) times a day as needed for constipation (while taking narcotic pain medication) 30 capsule 0   • losartan (COZAAR) 50 mg  "tablet Take 1 tablet (50 mg total) by mouth daily 90 tablet 3   • metoprolol tartrate (LOPRESSOR) 50 mg tablet Take 1 tablet (50 mg total) by mouth 2 (two) times a day 90 tablet 1   • pantoprazole (PROTONIX) 40 mg tablet      • zolpidem (AMBIEN) 10 mg tablet Take 1 tablet (10 mg total) by mouth daily at bedtime as needed for sleep 30 tablet 0     No current facility-administered medications for this visit.      Allergies:     No Known Allergies   Physical Exam:     BP 94/60   Pulse 78   Temp 97.9 °F (36.6 °C) (Temporal)   Resp 16   Ht 6' 5\" (1.956 m)   Wt 81.2 kg (179 lb)   SpO2 98%   BMI 21.23 kg/m²     Physical Exam  Vitals and nursing note reviewed.   Constitutional:       General: He is not in acute distress.     Appearance: Normal appearance. He is well-developed.   HENT:      Head: Normocephalic and atraumatic.   Eyes:      Conjunctiva/sclera: Conjunctivae normal.   Cardiovascular:      Rate and Rhythm: Normal rate and regular rhythm.      Heart sounds: No murmur heard.  Pulmonary:      Effort: Pulmonary effort is normal. No respiratory distress.      Breath sounds: Normal breath sounds.   Abdominal:      General: Abdomen is flat.      Palpations: Abdomen is soft.      Tenderness: There is no abdominal tenderness.   Musculoskeletal:         General: No swelling.      Cervical back: Normal range of motion and neck supple.   Skin:     General: Skin is warm and dry.      Capillary Refill: Capillary refill takes less than 2 seconds.   Neurological:      General: No focal deficit present.      Mental Status: He is alert and oriented to person, place, and time.   Psychiatric:         Mood and Affect: Mood normal.         Behavior: Behavior normal.         Thought Content: Thought content normal.         Judgment: Judgment normal.          HANNA Tapia  [facility] PRIMARY CARE    "

## 2024-01-11 NOTE — PATIENT INSTRUCTIONS
Vitron- C for iron replacement  Stop Losartan check blood pressure daily at least 1 hour after you take your metoprolol and your Cardizem if your blood pressure continues to be elevated greater than 140s over 90s then you may take losartan  Stop Ativan   Wellness Visit for Adults   AMBULATORY CARE:   A wellness visit  is when you see your healthcare provider to get screened for health problems. Your healthcare provider will also give you advice on how to stay healthy. Write down your questions so you remember to ask them. Ask your healthcare provider how often you should have a wellness visit.  What happens at a wellness visit:  Your healthcare provider will ask about your health, and your family history of health problems. This includes high blood pressure, heart disease, and cancer. He or she will ask if you have symptoms that concern you, if you smoke, and about your mood. You may also be asked about your intake of medicines, supplements, food, and alcohol. Any of the following may be done:  Your weight  will be checked. Your height may also be checked so your body mass index (BMI) can be calculated. Your BMI shows if you are at a healthy weight.    Your blood pressure  and heart rate will be checked. Your temperature may also be checked.    Blood and urine tests  may be done. Blood tests may be done to check your cholesterol levels. Abnormal cholesterol levels increase your risk for heart disease and stroke. You may also need a blood or urine test to check for diabetes if you are at increased risk. Urine tests may be done to look for signs of an infection or kidney disease.    A physical exam  includes checking your heartbeat and lungs with a stethoscope. Your healthcare provider may also check your skin to look for sun damage.    Screening tests  may be recommended. A screening test is done to check for diseases that may not cause symptoms. The screening tests you may need depend on your age, gender, family  history, and lifestyle habits. For example, colorectal screening may be recommended if you are 50 years old or older.    Screening tests you need if you are a woman:   A Pap smear  is used to screen for cervical cancer. Pap smears are usually done every 3 to 5 years depending on your age. You may need them more often if you have had abnormal Pap smear test results in the past. Ask your healthcare provider how often you should have a Pap smear.    A mammogram  is an x-ray of your breasts to screen for breast cancer. Experts recommend mammograms every 2 years starting at age 50 years. You may need a mammogram at age 49 years or younger if you have an increased risk for breast cancer. Talk to your healthcare provider about when you should start having mammograms and how often you need them.    Vaccines you may need:   Get an influenza vaccine  every year. The influenza vaccine protects you from the flu. Several types of viruses cause the flu. The viruses change over time, so new vaccines are made each year.    Get a tetanus-diphtheria (Td) booster vaccine  every 10 years. This vaccine protects you against tetanus and diphtheria. Tetanus is a severe infection that may cause painful muscle spasms and lockjaw. Diphtheria is a severe bacterial infection that causes a thick covering in the back of your mouth and throat.    Get a human papillomavirus (HPV) vaccine  if you are female and aged 19 to 26 or male 19 to 21 and never received it. This vaccine protects you from HPV infection. HPV is the most common infection spread by sexual contact. HPV may also cause vaginal, penile, and anal cancers.    Get a pneumococcal vaccine  if you are aged 65 years or older. The pneumococcal vaccine is an injection given to protect you from pneumococcal disease. Pneumococcal disease is an infection caused by pneumococcal bacteria. The infection may cause pneumonia, meningitis, or an ear infection.    Get a shingles vaccine  if you are 60  or older, even if you have had shingles before. The shingles vaccine is an injection to protect you from the varicella-zoster virus. This is the same virus that causes chickenpox. Shingles is a painful rash that develops in people who had chickenpox or have been exposed to the virus.    How to eat healthy:  My Plate is a model for planning healthy meals. It shows the types and amounts of foods that should go on your plate. Fruits and vegetables make up about half of your plate, and grains and protein make up the other half. A serving of dairy is included on the side of your plate. The amount of calories and serving sizes you need depends on your age, gender, weight, and height. Examples of healthy foods are listed below:  Eat a variety of vegetables  such as dark green, red, and orange vegetables. You can also include canned vegetables low in sodium (salt) and frozen vegetables without added butter or sauces.    Eat a variety of fresh fruits , canned fruit in 100% juice, frozen fruit, and dried fruit.    Include whole grains.  At least half of the grains you eat should be whole grains. Examples include whole-wheat bread, wheat pasta, brown rice, and whole-grain cereals such as oatmeal.    Eat a variety of protein foods such as seafood (fish and shellfish), lean meat, and poultry without skin (turkey and chicken). Examples of lean meats include pork leg, shoulder, or tenderloin, and beef round, sirloin, tenderloin, and extra lean ground beef. Other protein foods include eggs and egg substitutes, beans, peas, soy products, nuts, and seeds.    Choose low-fat dairy products such as skim or 1% milk or low-fat yogurt, cheese, and cottage cheese.    Limit unhealthy fats  such as butter, hard margarine, and shortening.       Exercise:  Exercise at least 30 minutes per day on most days of the week. Some examples of exercise include walking, biking, dancing, and swimming. You can also fit in more physical activity by taking  the stairs instead of the elevator or parking farther away from stores. Include muscle strengthening activities 2 days each week. Regular exercise provides many health benefits. It helps you manage your weight, and decreases your risk for type 2 diabetes, heart disease, stroke, and high blood pressure. Exercise can also help improve your mood. Ask your healthcare provider about the best exercise plan for you.       General health and safety guidelines:   Do not smoke.  Nicotine and other chemicals in cigarettes and cigars can cause lung damage. Ask your healthcare provider for information if you currently smoke and need help to quit. E-cigarettes or smokeless tobacco still contain nicotine. Talk to your healthcare provider before you use these products.    Limit alcohol.  A drink of alcohol is 12 ounces of beer, 5 ounces of wine, or 1½ ounces of liquor.    Lose weight, if needed.  Being overweight increases your risk of certain health conditions. These include heart disease, high blood pressure, type 2 diabetes, and certain types of cancer.    Protect your skin.  Do not sunbathe or use tanning beds. Use sunscreen with a SPF 15 or higher. Apply sunscreen at least 15 minutes before you go outside. Reapply sunscreen every 2 hours. Wear protective clothing, hats, and sunglasses when you are outside.    Drive safely.  Always wear your seatbelt. Make sure everyone in your car wears a seatbelt. A seatbelt can save your life if you are in an accident. Do not use your cell phone when you are driving. This could distract you and cause an accident. Pull over if you need to make a call or send a text message.    Practice safe sex.  Use latex condoms if are sexually active and have more than one partner. Your healthcare provider may recommend screening tests for sexually transmitted infections (STIs).    Wear helmets, lifejackets, and protective gear.  Always wear a helmet when you ride a bike or motorcycle, go skiing, or play  sports that could cause a head injury. Wear protective equipment when you play sports. Wear a lifejacket when you are on a boat or doing water sports.    © Copyright [employer] 2023 Information is for End User's use only and may not be sold, redistributed or otherwise used for commercial purposes.  The above information is an  only. It is not intended as medical advice for individual conditions or treatments. Talk to your doctor, nurse or pharmacist before following any medical regimen to see if it is safe and effective for you.

## 2024-01-11 NOTE — ASSESSMENT & PLAN NOTE
She has a history of abnormal CBC, increased platelet.  Has recently stopped drinking alcohol.  Will repeat levels if continues to have leukopenia thrombocytosis will refer to oncology

## 2024-01-11 NOTE — ASSESSMENT & PLAN NOTE
Patient has history of gastric bypass.  Discussed maintaining good nutrition.  Patient iron level is low discussed increasing iron intake.  Will recheck levels in 3 months

## 2024-01-11 NOTE — ASSESSMENT & PLAN NOTE
Patient is taking Lopressor Cardizem and aspirin.  Has follow-up with cardiology.  Denies any palpitations, shortness of breath

## 2024-03-08 DIAGNOSIS — F51.01 PRIMARY INSOMNIA: ICD-10-CM

## 2024-03-08 RX ORDER — ZOLPIDEM TARTRATE 10 MG/1
TABLET ORAL
Qty: 30 TABLET | Refills: 1 | Status: SHIPPED | OUTPATIENT
Start: 2024-03-08

## 2024-04-03 ENCOUNTER — APPOINTMENT (OUTPATIENT)
Dept: LAB | Facility: CLINIC | Age: 59
End: 2024-04-03
Payer: COMMERCIAL

## 2024-04-03 DIAGNOSIS — R17 ELEVATED BILIRUBIN: ICD-10-CM

## 2024-04-03 DIAGNOSIS — D69.6 THROMBOCYTOPENIA (HCC): ICD-10-CM

## 2024-04-03 DIAGNOSIS — D50.9 IRON DEFICIENCY ANEMIA, UNSPECIFIED IRON DEFICIENCY ANEMIA TYPE: ICD-10-CM

## 2024-04-03 LAB
ALBUMIN SERPL BCP-MCNC: 4.2 G/DL (ref 3.5–5)
ALP SERPL-CCNC: 176 U/L (ref 34–104)
ALT SERPL W P-5'-P-CCNC: 37 U/L (ref 7–52)
ANION GAP SERPL CALCULATED.3IONS-SCNC: 8 MMOL/L (ref 4–13)
AST SERPL W P-5'-P-CCNC: 43 U/L (ref 13–39)
BASOPHILS # BLD AUTO: 0.04 THOUSANDS/ÂΜL (ref 0–0.1)
BASOPHILS NFR BLD AUTO: 1 % (ref 0–1)
BILIRUB SERPL-MCNC: 1.84 MG/DL (ref 0.2–1)
BUN SERPL-MCNC: 14 MG/DL (ref 5–25)
CALCIUM SERPL-MCNC: 8.5 MG/DL (ref 8.4–10.2)
CHLORIDE SERPL-SCNC: 108 MMOL/L (ref 96–108)
CO2 SERPL-SCNC: 26 MMOL/L (ref 21–32)
CREAT SERPL-MCNC: 1.25 MG/DL (ref 0.6–1.3)
EOSINOPHIL # BLD AUTO: 0.2 THOUSAND/ÂΜL (ref 0–0.61)
EOSINOPHIL NFR BLD AUTO: 5 % (ref 0–6)
ERYTHROCYTE [DISTWIDTH] IN BLOOD BY AUTOMATED COUNT: 13 % (ref 11.6–15.1)
FERRITIN SERPL-MCNC: 41 NG/ML (ref 24–336)
GFR SERPL CREATININE-BSD FRML MDRD: 63 ML/MIN/1.73SQ M
GLUCOSE P FAST SERPL-MCNC: 98 MG/DL (ref 65–99)
HCT VFR BLD AUTO: 41.5 % (ref 36.5–49.3)
HGB BLD-MCNC: 13.6 G/DL (ref 12–17)
IMM GRANULOCYTES # BLD AUTO: 0.01 THOUSAND/UL (ref 0–0.2)
IMM GRANULOCYTES NFR BLD AUTO: 0 % (ref 0–2)
IRON SATN MFR SERPL: 23 % (ref 15–50)
IRON SERPL-MCNC: 84 UG/DL (ref 50–212)
LYMPHOCYTES # BLD AUTO: 1.02 THOUSANDS/ÂΜL (ref 0.6–4.47)
LYMPHOCYTES NFR BLD AUTO: 27 % (ref 14–44)
MCH RBC QN AUTO: 30.6 PG (ref 26.8–34.3)
MCHC RBC AUTO-ENTMCNC: 32.8 G/DL (ref 31.4–37.4)
MCV RBC AUTO: 94 FL (ref 82–98)
MONOCYTES # BLD AUTO: 0.45 THOUSAND/ÂΜL (ref 0.17–1.22)
MONOCYTES NFR BLD AUTO: 12 % (ref 4–12)
NEUTROPHILS # BLD AUTO: 2.07 THOUSANDS/ÂΜL (ref 1.85–7.62)
NEUTS SEG NFR BLD AUTO: 55 % (ref 43–75)
NRBC BLD AUTO-RTO: 0 /100 WBCS
PLATELET # BLD AUTO: 196 THOUSANDS/UL (ref 149–390)
PMV BLD AUTO: 11.1 FL (ref 8.9–12.7)
POTASSIUM SERPL-SCNC: 5.2 MMOL/L (ref 3.5–5.3)
PROT SERPL-MCNC: 6.8 G/DL (ref 6.4–8.4)
RBC # BLD AUTO: 4.44 MILLION/UL (ref 3.88–5.62)
SODIUM SERPL-SCNC: 142 MMOL/L (ref 135–147)
TIBC SERPL-MCNC: 363 UG/DL (ref 250–450)
UIBC SERPL-MCNC: 279 UG/DL (ref 155–355)
WBC # BLD AUTO: 3.79 THOUSAND/UL (ref 4.31–10.16)

## 2024-04-03 PROCEDURE — 36415 COLL VENOUS BLD VENIPUNCTURE: CPT

## 2024-04-03 PROCEDURE — 85025 COMPLETE CBC W/AUTO DIFF WBC: CPT

## 2024-04-03 PROCEDURE — 83550 IRON BINDING TEST: CPT

## 2024-04-03 PROCEDURE — 82728 ASSAY OF FERRITIN: CPT

## 2024-04-03 PROCEDURE — 83540 ASSAY OF IRON: CPT

## 2024-04-03 PROCEDURE — 80053 COMPREHEN METABOLIC PANEL: CPT

## 2024-04-11 ENCOUNTER — OFFICE VISIT (OUTPATIENT)
Dept: FAMILY MEDICINE CLINIC | Facility: CLINIC | Age: 59
End: 2024-04-11

## 2024-04-11 VITALS
OXYGEN SATURATION: 99 % | RESPIRATION RATE: 14 BRPM | DIASTOLIC BLOOD PRESSURE: 70 MMHG | SYSTOLIC BLOOD PRESSURE: 104 MMHG | WEIGHT: 196 LBS | HEART RATE: 74 BPM | TEMPERATURE: 98.2 F | BODY MASS INDEX: 23.14 KG/M2 | HEIGHT: 77 IN

## 2024-04-11 DIAGNOSIS — R74.8 ELEVATED ALKALINE PHOSPHATASE LEVEL: Primary | ICD-10-CM

## 2024-04-11 DIAGNOSIS — E78.5 HYPERLIPIDEMIA, UNSPECIFIED HYPERLIPIDEMIA TYPE: ICD-10-CM

## 2024-04-11 DIAGNOSIS — I10 PRIMARY HYPERTENSION: ICD-10-CM

## 2024-04-11 DIAGNOSIS — N52.8 OTHER MALE ERECTILE DYSFUNCTION: ICD-10-CM

## 2024-04-11 RX ORDER — DILTIAZEM HYDROCHLORIDE 60 MG/1
60 CAPSULE, EXTENDED RELEASE ORAL 2 TIMES DAILY
Qty: 60 CAPSULE | Refills: 5 | Status: SHIPPED | OUTPATIENT
Start: 2024-04-11 | End: 2024-10-08

## 2024-04-11 NOTE — PATIENT INSTRUCTIONS
Get ultrasound  Limit tylenol to 2000mg  Decrease Cardizem to 60mg  Get blood work done 2 days prior to appointment  Liver Disease Diet   AMBULATORY CARE:   A liver disease diet  provides the right amounts of calories, nutrients, and liquids you need to manage liver disease. Liver diseases, such as hepatitis and cirrhosis, may change the way your body uses nutrients from food. Some people with liver disease may not get enough nutrients and lose weight. Your dietitian will work with you to create a meal plan based on the type of liver disease you have.       Nutrients to include:  It is important to eat a variety of foods from all the food groups each day to stay at a healthy weight. You may not feel hungry, or you may feel full right away after you eat. Eat 4 to 6 small meals throughout the day to make sure you eat enough calories. Ask your dietitian how many calories and how much of the following nutrients you should have each day:  Protein  is important in the right amount. The following foods are good sources of protein. The amount of protein (in grams) follows each food listed below.         3 ounces of meat, pork, turkey, chicken, or fish (21 grams)    1 cup of milk or yogurt (8 grams)    1 large egg (7 grams)    2 tablespoons of peanut butter (7 grams)    ½ cup of tofu (7 grams)    ¼ cup of cottage cheese (7 grams)    1 ounce of cheese (7 grams)    ½ cup of cooked rosado, kidney, or navy beans (3 grams)    Fat  can be hard to digest for some people with liver disease. The fat that is not digested is eliminated in bowel movements. If you have this health problem, you may need to eat less fat. Ask your healthcare provider or dietitian for more information about a low-fat diet.    Carbohydrates  (carbs) may need to be limited because they affect blood sugar levels. Liver disease may cause blood sugar levels to be too high or too low in some people. Carbs are found in bread, pasta, rice, cereal, grains (rice,  oats), and starchy vegetables (potatoes, corn, peas). Your dietitian can help you create meal plans that have the right amount of carbs for you.    Foods to limit or avoid:  Ask your dietitian if you need to limit or avoid some foods. This will depend on the type of liver disease and other health problems you have.  Sodium  may need to be lowered if your body is retaining fluids. When you retain fluids, you will have swelling in your body. Ask for more information about a low-sodium diet. Some foods that contain high amounts of sodium:    Table salt    Florian, sausage, and deli meats    Canned vegetables and vegetable juice    Frozen dinners and packaged snack foods, such as potato chips and pretzels    Soy, barbecue, and teriyaki sauces    Soups       Liquids  may need to be limited if you have swelling. Liquids include water, milk, juice, soda, and other beverages. Some foods contain liquid, such as soup. Foods that are liquid at room temperature, such as gelatin or popsicles, must also be counted as a liquid. Ask your dietitian how much liquid you may drink each day.    Alcohol  may make your liver disease worse. It is important for you not to drink alcohol. Talk to your healthcare provider if you have questions about alcohol or need help to quit drinking.    Other diet guidelines to follow:  Your dietitian may ask you to take a vitamin and mineral supplement. Take only the supplement that your dietitian recommends. Talk to your dietitian about any other changes you need to make in your diet. Liver disease may cause health problems that you can manage through certain diet changes.  Call your doctor or dietitian if:   You gain or lose a lot of weight within a short amount of time.    You have questions or concerns about the liver disease diet.    Follow up with your doctor or dietitian as directed:  Write down your questions so you remember to ask them during your visits.  © Copyright [employer] 2023 Information is  for End User's use only and may not be sold, redistributed or otherwise used for commercial purposes.  The above information is an  only. It is not intended as medical advice for individual conditions or treatments. Talk to your doctor, nurse or pharmacist before following any medical regimen to see if it is safe and effective for you.

## 2024-04-12 PROBLEM — E78.5 HYPERLIPIDEMIA: Status: ACTIVE | Noted: 2024-04-12

## 2024-04-12 PROBLEM — N52.8 OTHER MALE ERECTILE DYSFUNCTION: Status: ACTIVE | Noted: 2024-04-12

## 2024-04-12 PROBLEM — R74.8 ELEVATED ALKALINE PHOSPHATASE LEVEL: Status: ACTIVE | Noted: 2024-04-12

## 2024-04-12 NOTE — PROGRESS NOTES
Name: Jurgen Rocha      : 1965      MRN: [id number]  Encounter Provider: HANNA Tapia  Encounter Date: 2024   Encounter department: [facility] PRIMARY CARE    Assessment & Plan     1. Elevated alkaline phosphatase level  Assessment & Plan:  Patient had blood work done.  Has elevated alkaline phosphatase level.  Did report prior to the blood work he was drinking alcohol.  Denies any abdominal pain.  Liver enzymes also elevated.  Ultrasound ordered.  Patient does have a history of alcohol overuse.  Discussed the importance of limiting alcohol intake    Orders:  -     US abdomen complete; Future; Expected date: 2024  -     Comprehensive metabolic panel; Future    2. Primary hypertension  Assessment & Plan:  Blood pressure is at goal.  Patient states that he has been taking 120 mg of Cardizem.  Will decrease to 60 mg due to side effects.  Continue Lopressor.  Heart rate is well-controlled.  Continue heart healthy diet.    Orders:  -     diltiazem (CARDIZEM SR) 60 mg 12 hr capsule; Take 1 capsule (60 mg total) by mouth 2 (two) times a day    3. Hyperlipidemia, unspecified hyperlipidemia type  -     Lipid panel; Future    4. Other male erectile dysfunction  Assessment & Plan:  Patient reports that he has problems sustaining an erection.  Did follow with urology.  No mechanical problems are noted.  Reports libido is fine.  Will decrease Cardizem to 60 mg evaluate for any improvement.  Discussed patient blood pressure is on the lower side hesitancy to prescribe Cialis or Viagra.  Will adjust dosage and medication first          Depression Screening and Follow-up Plan: Patient was screened for depression during today's encounter. They screened negative with a PHQ-2 score of 0.        Subjective      Patient has been seen for follow-up on chronic conditions.  Did have blood work done.      Review of Systems   Constitutional: Negative.    HENT: Negative.     Eyes: Negative.   "  Respiratory: Negative.     Cardiovascular: Negative.    Gastrointestinal: Negative.    Endocrine: Negative.    Genitourinary: Negative.    Musculoskeletal: Negative.    Skin: Negative.    Allergic/Immunologic: Negative.    Neurological: Negative.    Hematological: Negative.    Psychiatric/Behavioral: Negative.         Current Outpatient Medications on File Prior to Visit   Medication Sig    aspirin (ECOTRIN LOW STRENGTH) 81 mg EC tablet Take 81 mg by mouth    cholestyramine (QUESTRAN) 4 g packet Take 1 packet (4 g total) by mouth 2 (two) times a day with meals    Creon [id number] units CPEP TAKE ONE CAPSULE BY MOUTH THREE TIMES DAILY BEFORE A MEAL AND SNACKS    docusate sodium (COLACE) 100 mg capsule Take 1 capsule (100 mg total) by mouth 3 (three) times a day as needed for constipation (while taking narcotic pain medication)    losartan (COZAAR) 50 mg tablet Take 1 tablet (50 mg total) by mouth daily    metoprolol tartrate (LOPRESSOR) 50 mg tablet Take 1 tablet (50 mg total) by mouth 2 (two) times a day    pantoprazole (PROTONIX) 40 mg tablet     zolpidem (AMBIEN) 10 mg tablet TAKE 1 TABLET(10 MG) BY MOUTH DAILY AT BEDTIME AS NEEDED FOR SLEEP       Objective     /70   Pulse 74   Temp 98.2 °F (36.8 °C) (Temporal)   Resp 14   Ht 6' 5\" (1.956 m)   Wt 88.9 kg (196 lb)   SpO2 99%   BMI 23.24 kg/m²     Physical Exam  Vitals and nursing note reviewed.   Constitutional:       Appearance: Normal appearance. He is well-developed.   HENT:      Head: Normocephalic and atraumatic.   Cardiovascular:      Rate and Rhythm: Normal rate and regular rhythm.      Pulses: Normal pulses.      Heart sounds: Normal heart sounds.   Pulmonary:      Breath sounds: Normal breath sounds.   Abdominal:      General: Bowel sounds are normal.      Palpations: Abdomen is soft.   Musculoskeletal:         General: Normal range of motion.   Skin:     General: Skin is warm and dry.   Neurological:      General: No focal deficit " present.      Mental Status: He is alert and oriented to person, place, and time.   Psychiatric:         Mood and Affect: Mood normal.         Behavior: Behavior normal.         Thought Content: Thought content normal.         Judgment: Judgment normal.       HANNA Tapia

## 2024-04-12 NOTE — ASSESSMENT & PLAN NOTE
Patient reports that he has problems sustaining an erection.  Did follow with urology.  No mechanical problems are noted.  Reports libido is fine.  Will decrease Cardizem to 60 mg evaluate for any improvement.  Discussed patient blood pressure is on the lower side hesitancy to prescribe Cialis or Viagra.  Will adjust dosage and medication first

## 2024-04-12 NOTE — ASSESSMENT & PLAN NOTE
Patient had blood work done.  Has elevated alkaline phosphatase level.  Did report prior to the blood work he was drinking alcohol.  Denies any abdominal pain.  Liver enzymes also elevated.  Ultrasound ordered.  Patient does have a history of alcohol overuse.  Discussed the importance of limiting alcohol intake

## 2024-04-12 NOTE — ASSESSMENT & PLAN NOTE
Blood pressure is at goal.  Patient states that he has been taking 120 mg of Cardizem.  Will decrease to 60 mg due to side effects.  Continue Lopressor.  Heart rate is well-controlled.  Continue heart healthy diet.

## 2024-04-19 ENCOUNTER — APPOINTMENT (OUTPATIENT)
Dept: LAB | Facility: CLINIC | Age: 59
End: 2024-04-19
Payer: COMMERCIAL

## 2024-04-19 DIAGNOSIS — E78.5 HYPERLIPIDEMIA, UNSPECIFIED HYPERLIPIDEMIA TYPE: ICD-10-CM

## 2024-04-19 DIAGNOSIS — R74.8 ELEVATED ALKALINE PHOSPHATASE LEVEL: ICD-10-CM

## 2024-04-19 LAB
ALBUMIN SERPL BCP-MCNC: 4.1 G/DL (ref 3.5–5)
ALP SERPL-CCNC: 157 U/L (ref 34–104)
ALT SERPL W P-5'-P-CCNC: 28 U/L (ref 7–52)
ANION GAP SERPL CALCULATED.3IONS-SCNC: 8 MMOL/L (ref 4–13)
AST SERPL W P-5'-P-CCNC: 29 U/L (ref 13–39)
BILIRUB SERPL-MCNC: 1.6 MG/DL (ref 0.2–1)
BUN SERPL-MCNC: 14 MG/DL (ref 5–25)
CALCIUM SERPL-MCNC: 8.5 MG/DL (ref 8.4–10.2)
CHLORIDE SERPL-SCNC: 111 MMOL/L (ref 96–108)
CHOLEST SERPL-MCNC: 89 MG/DL
CO2 SERPL-SCNC: 25 MMOL/L (ref 21–32)
CREAT SERPL-MCNC: 1.12 MG/DL (ref 0.6–1.3)
GFR SERPL CREATININE-BSD FRML MDRD: 73 ML/MIN/1.73SQ M
GLUCOSE P FAST SERPL-MCNC: 100 MG/DL (ref 65–99)
HDLC SERPL-MCNC: 56 MG/DL
LDLC SERPL CALC-MCNC: 26 MG/DL (ref 0–100)
NONHDLC SERPL-MCNC: 33 MG/DL
POTASSIUM SERPL-SCNC: 3.9 MMOL/L (ref 3.5–5.3)
PROT SERPL-MCNC: 6.6 G/DL (ref 6.4–8.4)
SODIUM SERPL-SCNC: 144 MMOL/L (ref 135–147)
TRIGL SERPL-MCNC: 33 MG/DL

## 2024-04-19 PROCEDURE — 36415 COLL VENOUS BLD VENIPUNCTURE: CPT

## 2024-04-19 PROCEDURE — 80053 COMPREHEN METABOLIC PANEL: CPT

## 2024-04-19 PROCEDURE — 80061 LIPID PANEL: CPT

## 2024-04-23 ENCOUNTER — HOSPITAL ENCOUNTER (OUTPATIENT)
Dept: ULTRASOUND IMAGING | Facility: HOSPITAL | Age: 59
Discharge: HOME | End: 2024-04-23
Payer: COMMERCIAL

## 2024-04-23 DIAGNOSIS — R74.8 ELEVATED ALKALINE PHOSPHATASE LEVEL: ICD-10-CM

## 2024-04-23 PROCEDURE — 76705 ECHO EXAM OF ABDOMEN: CPT

## 2024-05-07 DIAGNOSIS — F51.01 PRIMARY INSOMNIA: ICD-10-CM

## 2024-05-08 ENCOUNTER — OFFICE VISIT (OUTPATIENT)
Dept: FAMILY MEDICINE CLINIC | Facility: CLINIC | Age: 59
End: 2024-05-08
Payer: COMMERCIAL

## 2024-05-08 VITALS
SYSTOLIC BLOOD PRESSURE: 98 MMHG | TEMPERATURE: 97.5 F | WEIGHT: 196 LBS | RESPIRATION RATE: 12 BRPM | HEIGHT: 77 IN | OXYGEN SATURATION: 100 % | BODY MASS INDEX: 23.14 KG/M2 | HEART RATE: 61 BPM | DIASTOLIC BLOOD PRESSURE: 70 MMHG

## 2024-05-08 DIAGNOSIS — R74.8 ELEVATED ALKALINE PHOSPHATASE LEVEL: ICD-10-CM

## 2024-05-08 DIAGNOSIS — I10 PRIMARY HYPERTENSION: Primary | ICD-10-CM

## 2024-05-08 DIAGNOSIS — N52.01 ERECTILE DYSFUNCTION DUE TO ARTERIAL INSUFFICIENCY: ICD-10-CM

## 2024-05-08 PROCEDURE — 99214 OFFICE O/P EST MOD 30 MIN: CPT | Performed by: NURSE PRACTITIONER

## 2024-05-08 RX ORDER — ZOLPIDEM TARTRATE 10 MG/1
TABLET ORAL
Qty: 30 TABLET | Refills: 1 | Status: SHIPPED | OUTPATIENT
Start: 2024-05-08

## 2024-05-08 NOTE — ASSESSMENT & PLAN NOTE
Patient is hypotensive.  Did cut back on Cardizem.  Does report some fatigue.  Will stop losartan.  Continue Lopressor and Cardizem.  Continue with low-salt heart healthy diet.  Discussed management of orthostatic hypotension.

## 2024-05-08 NOTE — PROGRESS NOTES
Name: Jurgen Rocha      : 1965      MRN: [id number]  Encounter Provider: HANNA Tapia  Encounter Date: 2024   Encounter department: [facility] PRIMARY CARE    Assessment & Plan     1. Primary hypertension  Assessment & Plan:  Patient is hypotensive.  Did cut back on Cardizem.  Does report some fatigue.  Will stop losartan.  Continue Lopressor and Cardizem.  Continue with low-salt heart healthy diet.  Discussed management of orthostatic hypotension.      2. Elevated alkaline phosphatase level  Assessment & Plan:  Viewed ultrasound.  Discussed fatty liver.,  Discussed alcohol intake.  Patient reports that he has stopped drinking alcohol.  Continue low-fat diet.  May take vitamin D daily.      3. Erectile dysfunction due to arterial insufficiency  Assessment & Plan:  Ports erectile dysfunction.  Would like pharmacological intervention.  Patient is hypotensive.  Will try to stabilize blood pressure first.  Patient is agreement with plan.          Depression Screening and Follow-up Plan: Patient was screened for depression during today's encounter. They screened negative with a PHQ-2 score of 0.        Subjective      Patient is here to follow-up on labs and elevated blood pressure.  Generally feels well.      Review of Systems   Constitutional: Negative.    HENT: Negative.     Eyes: Negative.    Respiratory: Negative.     Cardiovascular: Negative.    Gastrointestinal: Negative.  Negative for abdominal distention and abdominal pain.   Endocrine: Negative.    Genitourinary: Negative.    Musculoskeletal: Negative.    Skin: Negative.    Allergic/Immunologic: Negative.    Neurological: Negative.    Hematological: Negative.    Psychiatric/Behavioral: Negative.         Current Outpatient Medications on File Prior to Visit   Medication Sig   • aspirin (ECOTRIN LOW STRENGTH) 81 mg EC tablet Take 81 mg by mouth   • cholestyramine (QUESTRAN) 4 g packet Take 1 packet (4 g total) by mouth 2 (two)  "times a day with meals   • Creon [id number] units CPEP TAKE ONE CAPSULE BY MOUTH THREE TIMES DAILY BEFORE A MEAL AND SNACKS   • diltiazem (CARDIZEM SR) 60 mg 12 hr capsule Take 1 capsule (60 mg total) by mouth 2 (two) times a day   • metoprolol tartrate (LOPRESSOR) 50 mg tablet Take 1 tablet (50 mg total) by mouth 2 (two) times a day   • pantoprazole (PROTONIX) 40 mg tablet    • zolpidem (AMBIEN) 10 mg tablet TAKE 1 TABLET(10 MG) BY MOUTH DAILY AT BEDTIME AS NEEDED FOR SLEEP   • [DISCONTINUED] losartan (COZAAR) 50 mg tablet Take 1 tablet (50 mg total) by mouth daily   • docusate sodium (COLACE) 100 mg capsule Take 1 capsule (100 mg total) by mouth 3 (three) times a day as needed for constipation (while taking narcotic pain medication) (Patient not taking: Reported on 5/8/2024)       Objective     BP 98/70   Pulse 61   Temp 97.5 °F (36.4 °C) (Temporal)   Resp 12   Ht 6' 5\" (1.956 m)   Wt 88.9 kg (196 lb)   SpO2 100%   BMI 23.24 kg/m²     Physical Exam  Vitals and nursing note reviewed.   Constitutional:       Appearance: Normal appearance. He is well-developed.   HENT:      Head: Normocephalic and atraumatic.   Cardiovascular:      Rate and Rhythm: Normal rate and regular rhythm.      Pulses: Normal pulses.      Heart sounds: Normal heart sounds.   Abdominal:      Palpations: Abdomen is soft.   Musculoskeletal:         General: Normal range of motion.   Skin:     General: Skin is warm and dry.   Neurological:      Mental Status: He is alert and oriented to person, place, and time.   Psychiatric:         Mood and Affect: Mood normal.         Behavior: Behavior normal.         Thought Content: Thought content normal.         Judgment: Judgment normal.       HANNA Tapia    "

## 2024-05-08 NOTE — ASSESSMENT & PLAN NOTE
Viewed ultrasound.  Discussed fatty liver.,  Discussed alcohol intake.  Patient reports that he has stopped drinking alcohol.  Continue low-fat diet.  May take vitamin D daily.

## 2024-05-08 NOTE — ASSESSMENT & PLAN NOTE
Ports erectile dysfunction.  Would like pharmacological intervention.  Patient is hypotensive.  Will try to stabilize blood pressure first.  Patient is agreement with plan.

## 2024-05-08 NOTE — PATIENT INSTRUCTIONS
Stop cozaar  Continue all other rmeds  Take Vitamin E daily for fatty liver  Check blood pressure daily 1 hour after taking  meds

## 2024-05-22 ENCOUNTER — OFFICE VISIT (OUTPATIENT)
Dept: FAMILY MEDICINE CLINIC | Facility: CLINIC | Age: 59
End: 2024-05-22
Payer: COMMERCIAL

## 2024-05-22 VITALS
HEIGHT: 77 IN | RESPIRATION RATE: 14 BRPM | OXYGEN SATURATION: 100 % | HEART RATE: 72 BPM | TEMPERATURE: 97.8 F | BODY MASS INDEX: 23.62 KG/M2 | DIASTOLIC BLOOD PRESSURE: 74 MMHG | WEIGHT: 200 LBS | SYSTOLIC BLOOD PRESSURE: 110 MMHG

## 2024-05-22 DIAGNOSIS — K21.9 GASTROESOPHAGEAL REFLUX DISEASE WITHOUT ESOPHAGITIS: ICD-10-CM

## 2024-05-22 DIAGNOSIS — N52.9 ERECTILE DYSFUNCTION, UNSPECIFIED ERECTILE DYSFUNCTION TYPE: ICD-10-CM

## 2024-05-22 DIAGNOSIS — I10 PRIMARY HYPERTENSION: Primary | ICD-10-CM

## 2024-05-22 DIAGNOSIS — N52.01 ERECTILE DYSFUNCTION DUE TO ARTERIAL INSUFFICIENCY: ICD-10-CM

## 2024-05-22 DIAGNOSIS — N52.8 OTHER MALE ERECTILE DYSFUNCTION: ICD-10-CM

## 2024-05-22 PROCEDURE — 99214 OFFICE O/P EST MOD 30 MIN: CPT | Performed by: NURSE PRACTITIONER

## 2024-05-22 RX ORDER — DILTIAZEM HYDROCHLORIDE 120 MG/1
120 CAPSULE, COATED, EXTENDED RELEASE ORAL DAILY
COMMUNITY
Start: 2024-05-06 | End: 2024-05-22 | Stop reason: DRUGHIGH

## 2024-05-22 RX ORDER — PANTOPRAZOLE SODIUM 40 MG/1
40 TABLET, DELAYED RELEASE ORAL DAILY PRN
Qty: 30 TABLET | Refills: 0 | Status: SHIPPED | OUTPATIENT
Start: 2024-05-22 | End: 2024-06-21

## 2024-05-22 RX ORDER — SILDENAFIL 25 MG/1
25 TABLET, FILM COATED ORAL DAILY PRN
Qty: 10 TABLET | Refills: 0 | Status: SHIPPED | OUTPATIENT
Start: 2024-05-22

## 2024-05-22 RX ORDER — LOSARTAN POTASSIUM 50 MG/1
TABLET ORAL
COMMUNITY
Start: 2024-05-11

## 2024-05-22 NOTE — PATIENT INSTRUCTIONS
Continue meds  Check blood pressure prior to Viagra use, check blood pressure after    Erectile Dysfunction   AMBULATORY CARE:   Erectile dysfunction (ED) , or impotence, is when you cannot get or keep an erection for sexual activity.   Call your doctor if:   You have chest pain, dizziness, or nausea after you take ED medicines or during or after sex.     You have an erection for more than 4 hours after you take your ED medicine.    You see blood in your urine.    You have changes in your vision, headaches, or back pain after you take ED medicine.    You have a painful erection.    You have questions or concerns about your condition or care.    Treatment  depends on the cause of your ED. You may need any of the following:  ED medicines  help you have an erection. These medicines are taken before you have sex. Follow instructions on how to use these medicines. You may have a life-threatening reaction if you mix ED medicines with medicines that contain nitrates. Medicines with nitrates include nitroglycerin and other heart medicines.    Testosterone  may be given to increase the levels in your blood and improve your ED. You may need to use a skin cream or wear a patch. Testosterone is also given as an injection.    Penis injections  may be done to help improve your blood flow.     A vacuum device  is a tube that is placed over the penis. A hand pump is connected to the tube and acts as a vacuum. This may help increase blood flow to the penis.     Therapy  may be needed to treat emotional or relationship problems that may be causing your ED.     Surgery  may be recommended if other treatments do not work. Surgery includes a penile implant or prosthesis. Surgery may also be done to improve blood flow. Ask for more information about surgeries for ED.    Decrease your risk for ED:   Do not smoke.  Smoking can increase your risk for ED. Nicotine and other chemicals in cigarettes and cigars can also cause lung damage. Ask  your healthcare provider for information if you currently smoke and need help to quit. E-cigarettes or smokeless tobacco still contain nicotine.    Control your blood sugar levels if you have diabetes.  Over time, high blood sugar levels can increase your risk for ED.     Limit alcohol.  Men should limit alcohol to 2 drinks a day. A drink of alcohol is 12 ounces of beer, 5 ounces of wine, or 1½ ounces of liquor.     Manage your medical conditions.  Eat a variety of healthy foods and stay physically active. Take your medicines as directed. They can help control conditions that may cause ED.     Manage stress. Learn ways to relax, such as deep breathing, meditation, and listening to music.     Do not use illegal drugs.  They increase your risk for ED.    Follow up with your doctor as directed:  Write down your questions so you remember to ask them during your visits.   © Copyright [employer] 2023 Information is for End User's use only and may not be sold, redistributed or otherwise used for commercial purposes.  The above information is an  only. It is not intended as medical advice for individual conditions or treatments. Talk to your doctor, nurse or pharmacist before following any medical regimen to see if it is safe and effective for you.

## 2024-05-22 NOTE — ASSESSMENT & PLAN NOTE
Reports erectile dysfunction.  Discussed previously had concerns regarding hypotension.  Blood pressure has stabilized.  Low-dose of Viagra ordered.  Discussed checking blood pressure prior to medication, checking after discussed maintaining safety.

## 2024-05-22 NOTE — PROGRESS NOTES
Ambulatory Visit  Name: Jurgen Rocha      : 1965      MRN: [id number]  Encounter Provider: HANNA Tapia  Encounter Date: 2024   Encounter department: [facility] PRIMARY CARE    Assessment & Plan   1. Primary hypertension  Assessment & Plan:  Patient has been taking 60 mg of Cardizem twice a day and this dosage was decreased.  Blood pressure has stabilized.  Continue metoprolol.  Continue low-salt heart healthy diet.  2. Erectile dysfunction, unspecified erectile dysfunction type  -     sildenafil (VIAGRA) 25 MG tablet; Take 1 tablet (25 mg total) by mouth daily as needed for erectile dysfunction  3. Gastroesophageal reflux disease without esophagitis  -     pantoprazole (PROTONIX) 40 mg tablet; Take 1 tablet (40 mg total) by mouth daily as needed (acid reflux)  4. Erectile dysfunction due to arterial insufficiency  5. Other male erectile dysfunction  Assessment & Plan:  Reports erectile dysfunction.  Discussed previously had concerns regarding hypotension.  Blood pressure has stabilized.  Low-dose of Viagra ordered.  Discussed checking blood pressure prior to medication, checking after discussed maintaining safety.       History of Present Illness   {Disappearing Hyperlinks I Encounters * My Last Note * Since Last Visit * History :10497}  Patient is here to follow-up on hypotension.  I decreased Cardizem to 60 mg twice a day.  Reports improvement with fatigue.  Blood pressure at home is 120/80.      Review of Systems   Constitutional: Negative.  Negative for fatigue.   HENT: Negative.     Eyes: Negative.    Respiratory: Negative.     Cardiovascular: Negative.    Gastrointestinal: Negative.    Endocrine: Negative.    Genitourinary: Negative.    Musculoskeletal: Negative.    Skin: Negative.    Allergic/Immunologic: Negative.    Neurological: Negative.    Hematological: Negative.    Psychiatric/Behavioral: Negative.       Past Medical History:   Diagnosis Date   • A-fib ([facility])    •  Anemia 10 2019   • Clotting disorder ([facility]) 10 2019   • CPAP (continuous positive airway pressure) dependence    • Sleep apnea    • Visual impairment 2020     Past Surgical History:   Procedure Laterality Date   • CHOLECYSTECTOMY  2018    Gastric bypass   • COLONOSCOPY     • EGD     • GASTRIC BYPASS     • HERNIA REPAIR Right 10/18/2023    Procedure: REPAIR HERNIA INGUINAL, LAPAROSCOPIC;  Surgeon: Chris Fonseca DO;  Location: [facility];  Service: General   • LEFT ATRIAL APPENDAGE OCCLUSION      Watchman     History reviewed. No pertinent family history.  Social History     Tobacco Use   • Smoking status: Former     Current packs/day: 0.00     Types: Cigars, Cigarettes     Start date:      Quit date: 2018     Years since quittin.8     Passive exposure: Never   • Smokeless tobacco: Never   Vaping Use   • Vaping status: Never Used   Substance and Sexual Activity   • Alcohol use: Not Currently     Comment: wine 2x week   • Drug use: Not Currently     Types: Marijuana   • Sexual activity: Yes     Partners: Female     Birth control/protection: Male Sterilization     Current Outpatient Medications on File Prior to Visit   Medication Sig   • aspirin (ECOTRIN LOW STRENGTH) 81 mg EC tablet Take 81 mg by mouth   • cholestyramine (QUESTRAN) 4 g packet Take 1 packet (4 g total) by mouth 2 (two) times a day with meals   • Creon [id number] units CPEP TAKE ONE CAPSULE BY MOUTH THREE TIMES DAILY BEFORE A MEAL AND SNACKS   • diltiazem (CARDIZEM SR) 60 mg 12 hr capsule Take 1 capsule (60 mg total) by mouth 2 (two) times a day   • losartan (COZAAR) 50 mg tablet    • metoprolol tartrate (LOPRESSOR) 50 mg tablet Take 1 tablet (50 mg total) by mouth 2 (two) times a day   • zolpidem (AMBIEN) 10 mg tablet TAKE 1 TABLET(10 MG) BY MOUTH DAILY AT BEDTIME AS NEEDED FOR SLEEP   • [DISCONTINUED] pantoprazole (PROTONIX) 40 mg tablet    • [DISCONTINUED] diltiazem (CARDIZEM CD) 120 mg 24 hr capsule Take 120 mg by mouth daily  "(Patient not taking: Reported on 5/22/2024)   • [DISCONTINUED] docusate sodium (COLACE) 100 mg capsule Take 1 capsule (100 mg total) by mouth 3 (three) times a day as needed for constipation (while taking narcotic pain medication) (Patient not taking: Reported on 5/8/2024)     No Known Allergies  Immunization History   Administered Date(s) Administered   • COVID-19 MODERNA VACC 0.5 ML IM 03/22/2021, 04/19/2021   • INFLUENZA 11/03/2023     Objective   {Disappearing Hyperlinks   Review Vitals * Enter New Vitals * Results Review * Labs * Imaging * Cardiology * Procedures * Lung Cancer Screening :12657}  /74   Pulse 72   Temp 97.8 °F (36.6 °C) (Temporal)   Resp 14   Ht 6' 5\" (1.956 m)   Wt 90.7 kg (200 lb)   SpO2 100%   BMI 23.72 kg/m²     Physical Exam  Vitals and nursing note reviewed.   Constitutional:       Appearance: Normal appearance. He is well-developed.   HENT:      Head: Normocephalic and atraumatic.   Cardiovascular:      Rate and Rhythm: Normal rate and regular rhythm.      Pulses: Normal pulses.      Heart sounds: Normal heart sounds.   Pulmonary:      Effort: Pulmonary effort is normal.      Breath sounds: Normal breath sounds.   Musculoskeletal:         General: Normal range of motion.      Cervical back: Normal range of motion.   Skin:     General: Skin is warm and dry.   Neurological:      General: No focal deficit present.      Mental Status: He is alert and oriented to person, place, and time.   Psychiatric:         Mood and Affect: Mood normal.         Behavior: Behavior normal.         Thought Content: Thought content normal.         Judgment: Judgment normal.       Administrative Statements {Disappearing Hyperlinks I  Level of Service * [facility]/PCSP:07231}    "

## 2024-05-22 NOTE — ASSESSMENT & PLAN NOTE
Patient has been taking 60 mg of Cardizem twice a day and this dosage was decreased.  Blood pressure has stabilized.  Continue metoprolol.  Continue low-salt heart healthy diet.

## 2024-05-24 ENCOUNTER — TELEPHONE (OUTPATIENT)
Age: 59
End: 2024-05-24

## 2024-05-24 NOTE — TELEPHONE ENCOUNTER
PA for sildenafil (VIAGRA) 25 MG tablet      Submitted via    []CMM-KEY   [x][facility]-Case ID # [id number]   []Faxed to plan   []Other website   []Phone call Case ID #     Office notes sent, clinical questions answered. Awaiting determination    Turnaround time for your insurance to make a decision on your Prior Authorization can take 7-21 business days.

## 2024-05-25 NOTE — TELEPHONE ENCOUNTER
PA for sildenafil (VIAGRA) 25 MG tablet   Approved   Date(s) approved 4/24/24-5/24/25  Case #[id number]    Patient advised by [x] [employer] Message                      [x] Phone call       Pharmacy advised by []Fax                                     [x]Phone call    Approval letter scanned into Media No

## 2024-06-03 DIAGNOSIS — Z90.49 HISTORY OF CHOLECYSTECTOMY: ICD-10-CM

## 2024-06-03 DIAGNOSIS — K21.9 GASTROESOPHAGEAL REFLUX DISEASE WITHOUT ESOPHAGITIS: ICD-10-CM

## 2024-06-03 RX ORDER — CHOLESTYRAMINE 4 G/9G
POWDER, FOR SUSPENSION ORAL
Qty: 60 EACH | Refills: 1 | Status: SHIPPED | OUTPATIENT
Start: 2024-06-03 | End: 2024-06-04

## 2024-06-03 RX ORDER — PANTOPRAZOLE SODIUM 40 MG/1
TABLET, DELAYED RELEASE ORAL
Qty: 30 TABLET | Refills: 5 | Status: SHIPPED | OUTPATIENT
Start: 2024-06-03

## 2024-06-04 RX ORDER — CHOLESTYRAMINE 4 G/9G
POWDER, FOR SUSPENSION ORAL
Qty: 180 EACH | Refills: 0 | Status: SHIPPED | OUTPATIENT
Start: 2024-06-04

## 2024-07-08 DIAGNOSIS — F51.01 PRIMARY INSOMNIA: ICD-10-CM

## 2024-07-09 RX ORDER — ZOLPIDEM TARTRATE 10 MG/1
TABLET ORAL
Qty: 30 TABLET | Refills: 1 | Status: SHIPPED | OUTPATIENT
Start: 2024-07-09

## 2024-09-03 DIAGNOSIS — F51.01 PRIMARY INSOMNIA: ICD-10-CM

## 2024-09-04 RX ORDER — ZOLPIDEM TARTRATE 10 MG/1
10 TABLET ORAL
Qty: 30 TABLET | Refills: 0 | Status: SHIPPED | OUTPATIENT
Start: 2024-09-04

## 2024-10-06 DIAGNOSIS — F51.01 PRIMARY INSOMNIA: ICD-10-CM

## 2024-10-06 RX ORDER — ZOLPIDEM TARTRATE 10 MG/1
10 TABLET ORAL
Qty: 30 TABLET | Refills: 0 | Status: CANCELLED | OUTPATIENT
Start: 2024-10-06

## 2024-10-07 NOTE — TELEPHONE ENCOUNTER
This is not a duplicate request pt is looking to get a refill on the medication sent to the pharmacy. Pt is down to only 2 days left.

## 2024-10-08 RX ORDER — ZOLPIDEM TARTRATE 10 MG/1
TABLET ORAL
Qty: 30 TABLET | Refills: 1 | Status: SHIPPED | OUTPATIENT
Start: 2024-10-08

## 2024-10-28 ENCOUNTER — OFFICE VISIT (OUTPATIENT)
Dept: GASTROENTEROLOGY | Facility: CLINIC | Age: 59
End: 2024-10-28
Payer: COMMERCIAL

## 2024-10-28 VITALS
TEMPERATURE: 98.6 F | BODY MASS INDEX: 23.02 KG/M2 | HEIGHT: 77 IN | WEIGHT: 195 LBS | SYSTOLIC BLOOD PRESSURE: 108 MMHG | DIASTOLIC BLOOD PRESSURE: 82 MMHG | HEART RATE: 67 BPM | OXYGEN SATURATION: 98 %

## 2024-10-28 DIAGNOSIS — D64.9 ANEMIA, UNSPECIFIED TYPE: Primary | ICD-10-CM

## 2024-10-28 DIAGNOSIS — K76.0 FATTY LIVER: ICD-10-CM

## 2024-10-28 DIAGNOSIS — E61.1 IRON DEFICIENCY: ICD-10-CM

## 2024-10-28 PROCEDURE — 99213 OFFICE O/P EST LOW 20 MIN: CPT | Performed by: INTERNAL MEDICINE

## 2024-10-28 NOTE — PROGRESS NOTES
[facility] - Outpatient Note  Jurgen Rocha 59 y.o. male MRN: [id number]  Encounter: [id number]      ASSESSMENT AND PLAN:    Jurgen Rocha is a 58 y.o. old pleasant male with PMH of gastric bypass in 2008 complicated by marginal ulcer, history of cholecystectomy, sleep apnea, atrial fibrillation status post Watchman (not on anticoagulation), hypertension, history of PUD who presents for consultation for hernia     Blood in stool-single episode that resolved after eating out.  Suspect mild gastroenteritis with possible hemorrhoid bleeding  Hold off on further workup given symptoms resolved and recent EGD and colonoscopy     Chronic diarrhea-resolved with Questran suspect a component of bile acid induced diarrhea from history of cholecystectomy  Continue Questran  Continue to hold off Creon     Alcohol use-sober for the past several months  Congratulated him on alcohol cessation     Right-sided inguinal hernia-status post inguinal hernia repair by Dr. Pillai  He is doing well from inguinal hernia standpoint and has no pain or symptoms.     Fatty liver, mild elevation of liver enzymes-no obvious signs or symptoms of cirrhosis  Will consider elastography during next visit  Counseled on continued alcohol cessation      History of iron deficient anemia -I will recheck his CBC and iron panel.  If no evidence of iron deficient anemia then he can likely stop his iron supplementation and see how his body holds up without any supplementation    1. Anemia, unspecified type    - CBC and Platelet; Future    2. Iron deficiency    - CBC and Platelet; Future    3. Fatty liver    - Comprehensive metabolic panel; Future    ______________________________________________________________________    SUBJECTIVE: Patient here for acute visit as he reports last week he had eaten some food and had immediate diarrhea with 1-2 episodes of small amount of red blood.  He reports symptoms have resolved and he feels  "well overall.  He states his diarrhea has resolved chronically with Questran.  He has gained weight.      I reviewed prior external notes    I reviewed previous lab results and images      REVIEW OF SYSTEMS:     REVIEW OF ALL OTHER SYSTEMS IS OTHERWISE NEGATIVE.      Historical Information   Past Medical History:   Diagnosis Date    A-fib (HCC)     Anemia 10 2019    Clotting disorder (HCC) 10 2019    CPAP (continuous positive airway pressure) dependence     Sleep apnea     Visual impairment 2020     Past Surgical History:   Procedure Laterality Date    CHOLECYSTECTOMY  2018    Gastric bypass    COLONOSCOPY      EGD      GASTRIC BYPASS      HERNIA REPAIR Right 10/18/2023    Procedure: REPAIR HERNIA INGUINAL, LAPAROSCOPIC;  Surgeon: Chris Fonseca DO;  Location: [facility];  Service: General    LEFT ATRIAL APPENDAGE OCCLUSION      Watchman     Social History   Social History     Substance and Sexual Activity   Alcohol Use Not Currently    Comment: wine 2x week     Social History     Substance and Sexual Activity   Drug Use Not Currently    Types: Marijuana     Social History     Tobacco Use   Smoking Status Former    Current packs/day: 0.00    Types: Cigars, Cigarettes    Start date:     Quit date: 2018    Years since quittin.3    Passive exposure: Never   Smokeless Tobacco Never     History reviewed. No pertinent family history.    Meds/Allergies       Current Outpatient Medications:     aspirin (ECOTRIN LOW STRENGTH) 81 mg EC tablet    cholestyramine (QUESTRAN) 4 g packet    Creon 09528-866999 units CPEP    diltiazem (CARDIZEM SR) 60 mg 12 hr capsule    losartan (COZAAR) 50 mg tablet    metoprolol tartrate (LOPRESSOR) 50 mg tablet    pantoprazole (PROTONIX) 40 mg tablet    sildenafil (VIAGRA) 25 MG tablet    zolpidem (AMBIEN) 10 mg tablet    No Known Allergies        Objective     Blood pressure 108/82, pulse 67, temperature 98.6 °F (37 °C), temperature source Tympanic, height 6' 5\" (1.956 m), " weight 88.5 kg (195 lb), SpO2 98%. Body mass index is 23.12 kg/m².      PHYSICAL EXAM:      General Appearance:   Alert, cooperative, no distress   HEENT:   Normocephalic, atraumatic, anicteric.     Neck:  Supple, symmetrical, trachea midline   Lungs:   Clear to auscultation bilaterally; no rales, rhonchi or wheezing; respirations unlabored    Heart::   Regular rate and rhythm; no murmur, rub, or gallop.   Abdomen:   Soft, non-tender, non-distended; normal bowel sounds; no masses, no organomegaly    Genitalia:   Deferred    Rectal:   Deferred    Extremities:  No cyanosis, clubbing or edema    Pulses:  2+ and symmetric    Skin:  No jaundice, rashes, or lesions    Lymph nodes:  No palpable cervical lymphadenopathy        Lab Results:   No visits with results within 1 Day(s) from this visit.   Latest known visit with results is:   Appointment on 04/19/2024   Component Date Value    Sodium 04/19/2024 144     Potassium 04/19/2024 3.9     Chloride 04/19/2024 111 (H)     CO2 04/19/2024 25     ANION GAP 04/19/2024 8     BUN 04/19/2024 14     Creatinine 04/19/2024 1.12     Glucose, Fasting 04/19/2024 100 (H)     Calcium 04/19/2024 8.5     AST 04/19/2024 29     ALT 04/19/2024 28     Alkaline Phosphatase 04/19/2024 157 (H)     Total Protein 04/19/2024 6.6     Albumin 04/19/2024 4.1     Total Bilirubin 04/19/2024 1.60 (H)     eGFR 04/19/2024 73     Cholesterol 04/19/2024 89     Triglycerides 04/19/2024 33     HDL, Direct 04/19/2024 56     LDL Calculated 04/19/2024 26     Non-HDL-Chol (CHOL-HDL) 04/19/2024 33          Radiology Results:   No results found.

## 2024-10-29 ENCOUNTER — APPOINTMENT (OUTPATIENT)
Dept: LAB | Facility: CLINIC | Age: 59
End: 2024-10-29
Payer: COMMERCIAL

## 2024-10-29 DIAGNOSIS — K76.0 FATTY LIVER: ICD-10-CM

## 2024-10-29 DIAGNOSIS — D64.9 ANEMIA, UNSPECIFIED TYPE: ICD-10-CM

## 2024-10-29 DIAGNOSIS — E61.1 IRON DEFICIENCY: ICD-10-CM

## 2024-10-29 LAB
ALBUMIN SERPL BCG-MCNC: 3.9 G/DL (ref 3.5–5)
ALP SERPL-CCNC: 177 U/L (ref 34–104)
ALT SERPL W P-5'-P-CCNC: 15 U/L (ref 7–52)
ANION GAP SERPL CALCULATED.3IONS-SCNC: 7 MMOL/L (ref 4–13)
AST SERPL W P-5'-P-CCNC: 19 U/L (ref 13–39)
BILIRUB SERPL-MCNC: 1.26 MG/DL (ref 0.2–1)
BUN SERPL-MCNC: 10 MG/DL (ref 5–25)
CALCIUM SERPL-MCNC: 8 MG/DL (ref 8.4–10.2)
CHLORIDE SERPL-SCNC: 111 MMOL/L (ref 96–108)
CO2 SERPL-SCNC: 24 MMOL/L (ref 21–32)
CREAT SERPL-MCNC: 1.11 MG/DL (ref 0.6–1.3)
ERYTHROCYTE [DISTWIDTH] IN BLOOD BY AUTOMATED COUNT: 14.2 % (ref 11.6–15.1)
GFR SERPL CREATININE-BSD FRML MDRD: 73 ML/MIN/1.73SQ M
GLUCOSE P FAST SERPL-MCNC: 101 MG/DL (ref 65–99)
HCT VFR BLD AUTO: 29.9 % (ref 36.5–49.3)
HGB BLD-MCNC: 9.7 G/DL (ref 12–17)
MCH RBC QN AUTO: 30.9 PG (ref 26.8–34.3)
MCHC RBC AUTO-ENTMCNC: 32.4 G/DL (ref 31.4–37.4)
MCV RBC AUTO: 95 FL (ref 82–98)
PLATELET # BLD AUTO: 189 THOUSANDS/UL (ref 149–390)
PMV BLD AUTO: 11.1 FL (ref 8.9–12.7)
POTASSIUM SERPL-SCNC: 4.5 MMOL/L (ref 3.5–5.3)
PROT SERPL-MCNC: 5.8 G/DL (ref 6.4–8.4)
RBC # BLD AUTO: 3.14 MILLION/UL (ref 3.88–5.62)
SODIUM SERPL-SCNC: 142 MMOL/L (ref 135–147)
WBC # BLD AUTO: 4.4 THOUSAND/UL (ref 4.31–10.16)

## 2024-10-29 PROCEDURE — 36415 COLL VENOUS BLD VENIPUNCTURE: CPT

## 2024-10-29 PROCEDURE — 80053 COMPREHEN METABOLIC PANEL: CPT

## 2024-10-29 PROCEDURE — 85027 COMPLETE CBC AUTOMATED: CPT

## 2024-11-15 ENCOUNTER — OFFICE VISIT (OUTPATIENT)
Dept: FAMILY MEDICINE CLINIC | Facility: CLINIC | Age: 59
End: 2024-11-15
Payer: COMMERCIAL

## 2024-11-15 ENCOUNTER — APPOINTMENT (OUTPATIENT)
Dept: LAB | Facility: CLINIC | Age: 59
End: 2024-11-15
Payer: COMMERCIAL

## 2024-11-15 VITALS
RESPIRATION RATE: 14 BRPM | HEART RATE: 77 BPM | TEMPERATURE: 98.2 F | BODY MASS INDEX: 22.26 KG/M2 | HEIGHT: 78 IN | OXYGEN SATURATION: 100 % | WEIGHT: 192.4 LBS | DIASTOLIC BLOOD PRESSURE: 70 MMHG | SYSTOLIC BLOOD PRESSURE: 100 MMHG

## 2024-11-15 DIAGNOSIS — N52.8 OTHER MALE ERECTILE DYSFUNCTION: ICD-10-CM

## 2024-11-15 DIAGNOSIS — E61.1 IRON DEFICIENCY: ICD-10-CM

## 2024-11-15 DIAGNOSIS — F51.01 PRIMARY INSOMNIA: ICD-10-CM

## 2024-11-15 DIAGNOSIS — I48.20 CHRONIC ATRIAL FIBRILLATION (HCC): ICD-10-CM

## 2024-11-15 DIAGNOSIS — I10 PRIMARY HYPERTENSION: Primary | ICD-10-CM

## 2024-11-15 DIAGNOSIS — I10 PRIMARY HYPERTENSION: ICD-10-CM

## 2024-11-15 LAB
FERRITIN SERPL-MCNC: 20 NG/ML (ref 24–336)
IRON SATN MFR SERPL: 9 % (ref 15–50)
IRON SERPL-MCNC: 30 UG/DL (ref 50–212)
TIBC SERPL-MCNC: 350 UG/DL (ref 250–450)
UIBC SERPL-MCNC: 320 UG/DL (ref 155–355)

## 2024-11-15 PROCEDURE — 36415 COLL VENOUS BLD VENIPUNCTURE: CPT

## 2024-11-15 PROCEDURE — 82728 ASSAY OF FERRITIN: CPT

## 2024-11-15 PROCEDURE — 83550 IRON BINDING TEST: CPT

## 2024-11-15 PROCEDURE — 83540 ASSAY OF IRON: CPT

## 2024-11-15 PROCEDURE — 99214 OFFICE O/P EST MOD 30 MIN: CPT | Performed by: NURSE PRACTITIONER

## 2024-11-15 RX ORDER — ZOLPIDEM TARTRATE 10 MG/1
10 TABLET ORAL
Qty: 30 TABLET | Refills: 0 | Status: SHIPPED | OUTPATIENT
Start: 2024-11-15

## 2024-11-15 RX ORDER — DILTIAZEM HYDROCHLORIDE 60 MG/1
60 CAPSULE, EXTENDED RELEASE ORAL 2 TIMES DAILY
Qty: 60 CAPSULE | Refills: 5 | Status: SHIPPED | OUTPATIENT
Start: 2024-11-15 | End: 2025-05-14

## 2024-11-15 RX ORDER — METOPROLOL TARTRATE 25 MG/1
25 TABLET, FILM COATED ORAL EVERY 12 HOURS
Qty: 60 TABLET | Refills: 5 | Status: SHIPPED | OUTPATIENT
Start: 2024-11-15 | End: 2024-11-15

## 2024-11-15 RX ORDER — SILDENAFIL 50 MG/1
50 TABLET, FILM COATED ORAL DAILY PRN
Qty: 10 TABLET | Refills: 0 | Status: SHIPPED | OUTPATIENT
Start: 2024-11-15

## 2024-11-15 RX ORDER — METOPROLOL TARTRATE 25 MG/1
25 TABLET, FILM COATED ORAL EVERY 12 HOURS
Qty: 180 TABLET | Refills: 1 | Status: SHIPPED | OUTPATIENT
Start: 2024-11-15

## 2024-11-15 RX ORDER — DILTIAZEM HYDROCHLORIDE 120 MG/1
1 CAPSULE, COATED, EXTENDED RELEASE ORAL DAILY
COMMUNITY
Start: 2024-10-24 | End: 2024-11-15 | Stop reason: DRUGHIGH

## 2024-11-15 NOTE — ASSESSMENT & PLAN NOTE
Patient is hypotensive.  Will decrease metoprolol to 25 mg twice a day.  Continue Cardizem.  Patient does have an upcoming appointment with cardiology in [place].  Orders:    metoprolol tartrate (LOPRESSOR) 25 mg tablet; Take 1 tablet (25 mg total) by mouth every 12 (twelve) hours    diltiazem (CARDIZEM SR) 60 mg 12 hr capsule; Take 1 capsule (60 mg total) by mouth 2 (two) times a day

## 2024-11-15 NOTE — PROGRESS NOTES
Name: Jurgen Rohca      : 1965      MRN: [id number]  Encounter Provider: HANNA Tapia  Encounter Date: 11/15/2024   Encounter department: [facility]  :  Assessment & Plan  Primary hypertension  Patient is hypotensive.  Will decrease metoprolol to 25 mg twice a day.  Continue Cardizem.  Patient does have an upcoming appointment with cardiology in [place].  Orders:    metoprolol tartrate (LOPRESSOR) 25 mg tablet; Take 1 tablet (25 mg total) by mouth every 12 (twelve) hours    diltiazem (CARDIZEM SR) 60 mg 12 hr capsule; Take 1 capsule (60 mg total) by mouth 2 (two) times a day    Other male erectile dysfunction  Patient states that he has been taking Viagra but it has not been helping as much would like to increase the dose.  Offered referral to urology declined at this time.  Discussed safety concerns with taking medication.  Medication reordered  Orders:    sildenafil (VIAGRA) 50 MG tablet; Take 1 tablet (50 mg total) by mouth daily as needed for erectile dysfunction    Iron deficiency  History of iron deficiency anemia.  History of varicose disease, ulcer.  Hemoglobin is low.  Will get iron panel done.  Patient reports getting iron infusions, will evaluate the need after results  Orders:    Iron Panel (Includes Ferritin, Iron Sat%, Iron, and TIBC); Future    Primary insomnia  Has been taking Ambien every other day.  Reports that it does help.  Discussed other nonpharmacological measures to assist with sleep May use sleepy time tea, chamomile tea aromatherapy.  Orders:    zolpidem (AMBIEN) 10 mg tablet; Take 1 tablet (10 mg total) by mouth daily at bedtime as needed for sleep    Chronic atrial fibrillation (HCC)  Is doing well.  Has upcoming appointment cardiology in [place].  Continue with metoprolol, Cardizem              History of Present Illness     Patient is here for follow-up on chronic health conditions.  Recently had episode of hematochezia.  Did go to  "gastroenterology.  Reports getting iron infusions when he was in [place].  Hemoglobin slightly low.  Was on iron medications.      Review of Systems   Constitutional: Negative.  Negative for fever.   HENT: Negative.     Eyes: Negative.    Respiratory: Negative.     Cardiovascular: Negative.    Gastrointestinal:  Positive for blood in stool.   Endocrine: Negative.    Genitourinary: Negative.    Musculoskeletal: Negative.    Skin: Negative.    Allergic/Immunologic: Negative.    Neurological: Negative.    Hematological: Negative.    Psychiatric/Behavioral: Negative.            Objective   /70 (BP Location: Left arm, Patient Position: Sitting, Cuff Size: Extra-Large)   Pulse 77   Temp 98.2 °F (36.8 °C) (Tympanic)   Resp 14   Ht 6' 6\" (1.981 m)   Wt 87.3 kg (192 lb 6.4 oz)   SpO2 100%   BMI 22.23 kg/m²      Physical Exam  Vitals and nursing note reviewed.   Constitutional:       General: He is not in acute distress.     Appearance: Normal appearance. He is well-developed.   HENT:      Head: Normocephalic and atraumatic.   Cardiovascular:      Rate and Rhythm: Normal rate and regular rhythm.      Pulses: Normal pulses.      Heart sounds: Normal heart sounds. No murmur heard.  Pulmonary:      Effort: Pulmonary effort is normal. No respiratory distress.      Breath sounds: Normal breath sounds.   Abdominal:      Palpations: Abdomen is soft.      Tenderness: There is no abdominal tenderness.   Musculoskeletal:         General: No swelling.      Cervical back: Normal range of motion and neck supple.   Skin:     General: Skin is warm and dry.      Capillary Refill: Capillary refill takes less than 2 seconds.   Neurological:      General: No focal deficit present.      Mental Status: He is alert and oriented to person, place, and time.   Psychiatric:         Mood and Affect: Mood normal.         Behavior: Behavior normal.         Thought Content: Thought content normal.         Judgment: Judgment normal. "

## 2024-11-15 NOTE — PATIENT INSTRUCTIONS
"Decrease metoprolol to 25 mg twice a day  Use sleepy time or chamomile tea  Restart iron pills  Increase intake of iron rich foods  Patient Education     Anemia caused by low iron   The Basics   Written by the doctors and editors at [facility]   What is anemia? -- This is when a person has too few red blood cells or too little hemoglobin in their blood. Hemoglobin is inside red blood cells and helps the cells carry oxygen to all parts of the body. If your hemoglobin level is low, your body might not get all of the oxygen it needs.  What is iron deficiency anemia? -- Anemia can happen for a few different reasons. A common reason is not having enough iron. This is called \"iron deficiency\" or sometimes \"low iron.\"  You might not have enough iron if:   You have lost a large amount of blood - This can happen slowly over time, or all of a sudden. It is the most common cause of iron deficiency anemia.  Menstrual periods and pregnancy are common reasons to lose blood. In older people, tumors in the intestine can bleed. Sometimes, bleeding happens so slowly that you do not see the blood in your bowel movements.   Your body cannot absorb enough iron from food - This can happen if you had surgery on your stomach or intestines. It can also happen if you have a condition like celiac disease that affects your intestines.   You do not get enough iron in your food - This can be a problem in infants who do not get enough iron from formula, food, or supplements. It can also happen in parts of the world where people do not get enough iron in their diet.  What are the symptoms of iron deficiency anemia? -- Some people have no symptoms. People who do have symptoms might:   Feel irritable   Feel tired or weak, especially if they try to exercise or walk up stairs   Have headaches   Have chest pain or trouble breathing   Have abnormal cravings that make them want to eat ice or substances like shreyas or wallpaper   Have \"restless legs " "syndrome,\" where the legs feel like they need to keep moving, especially at night  Is there a test for anemia? -- Yes. Your doctor or nurse can test your blood for anemia. They most often check your \"hemoglobin\" and \"hematocrit.\" These are part of a test called a \"complete blood count,\" or \"CBC.\"  If blood tests show that you have anemia, or if you have symptoms of iron deficiency, your doctor or nurse will ask questions and do other blood tests. This will help them figure out what is causing your anemia and how best to treat it.  How is iron deficiency anemia treated? -- It is treated by giving you extra iron. Eating foods that contain iron is not enough. If your anemia is severe, you might need a blood transfusion. You might also need treatment for the cause of bleeding.  If you need treatment with iron, there are some things to know:   Iron comes in pills, or in a liquid you can get through an IV. (An IV is a thin tube that goes into a vein.) Your doctor or nurse will talk with you about which is best for you.   Iron pills are taken once every other day or once a day. They need to be taken for several months. In most cases, IV iron can be given in a single treatment or a small number of treatments.   Iron pills can cause side effects such as upset stomach and constipation (too few bowel movements, or bowel movements that are hard or painful).   Some people cannot get enough iron from pills. This might be the case if you had weight loss surgery or a condition called inflammatory bowel disease, or if you are pregnant and nearing the end of your pregnancy.   If you have side effects or cannot get enough iron from pills, there are things you can do to reduce these side effects, or you might switch to IV iron.  It is also important to find out why your iron was low. If it was caused by blood loss, the cause of bleeding needs to be found. Other causes also have important treatments. For example, if you have heavy " menstrual periods, your doctor might do tests to find out why. There are medicines that can make your period lighter or stop it completely. Follow all instructions about testing and treatment.  If you have questions about your care, or want to know more about your options, talk to your doctor or nurse. They can help.  All topics are updated as new evidence becomes available and our peer review process is complete.  This topic retrieved from [employer] on: Mar 22, 2024.  Topic 50516 Version 16.0  Release: 32.2.4 - C32.80  © 2024 UpToDate, Inc. and/or its affiliates. All rights reserved.  Consumer Information Use and Disclaimer   Disclaimer: This generalized information is a limited summary of diagnosis, treatment, and/or medication information. It is not meant to be comprehensive and should be used as a tool to help the user understand and/or assess potential diagnostic and treatment options. It does NOT include all information about conditions, treatments, medications, side effects, or risks that may apply to a specific patient. It is not intended to be medical advice or a substitute for the medical advice, diagnosis, or treatment of a health care provider based on the health care provider's examination and assessment of a patient's specific and unique circumstances. Patients must speak with a health care provider for complete information about their health, medical questions, and treatment options, including any risks or benefits regarding use of medications. This information does not endorse any treatments or medications as safe, effective, or approved for treating a specific patient. UpToDate, Inc. and its affiliates disclaim any warranty or liability relating to this information or the use thereof.The use of this information is governed by the Terms of Use, available at https://www.wolterskluwer.com/en/know/clinical-effectiveness-terms. 2024© UpToDate, Inc. and its affiliates and/or licensors. All rights  reserved.  Copyright   © 2024 [employer] and/or its affiliates. All rights reserved.

## 2024-11-15 NOTE — ASSESSMENT & PLAN NOTE
Is doing well.  Has upcoming appointment cardiology in [place].  Continue with metoprolol, Cardizem

## 2024-11-15 NOTE — ASSESSMENT & PLAN NOTE
Patient states that he has been taking Viagra but it has not been helping as much would like to increase the dose.  Offered referral to urology declined at this time.  Discussed safety concerns with taking medication.  Medication reordered  Orders:    sildenafil (VIAGRA) 50 MG tablet; Take 1 tablet (50 mg total) by mouth daily as needed for erectile dysfunction

## 2024-11-18 ENCOUNTER — RESULTS FOLLOW-UP (OUTPATIENT)
Dept: FAMILY MEDICINE CLINIC | Facility: CLINIC | Age: 59
End: 2024-11-18

## 2024-11-18 ENCOUNTER — TELEPHONE (OUTPATIENT)
Dept: FAMILY MEDICINE CLINIC | Facility: CLINIC | Age: 59
End: 2024-11-18

## 2024-11-18 PROBLEM — D63.8 ANEMIA IN OTHER CHRONIC DISEASES CLASSIFIED ELSEWHERE: Status: ACTIVE | Noted: 2024-11-18

## 2024-11-18 RX ORDER — CYANOCOBALAMIN 1000 UG/ML
1000 INJECTION, SOLUTION INTRAMUSCULAR; SUBCUTANEOUS ONCE
Status: CANCELLED | OUTPATIENT
Start: 2024-12-02 | End: 2024-12-02

## 2024-11-20 DIAGNOSIS — D63.8 ANEMIA IN OTHER CHRONIC DISEASES CLASSIFIED ELSEWHERE: Primary | ICD-10-CM

## 2024-11-20 RX ORDER — SODIUM CHLORIDE 9 MG/ML
20 INJECTION, SOLUTION INTRAVENOUS ONCE
OUTPATIENT
Start: 2024-11-27

## 2024-11-20 RX ORDER — CYANOCOBALAMIN 1000 UG/ML
1000 INJECTION, SOLUTION INTRAMUSCULAR; SUBCUTANEOUS ONCE
OUTPATIENT
Start: 2024-11-27 | End: 2024-11-27

## 2024-12-06 ENCOUNTER — TELEPHONE (OUTPATIENT)
Dept: INFUSION CENTER | Facility: CLINIC | Age: 59
End: 2024-12-06

## 2024-12-06 DIAGNOSIS — F51.01 PRIMARY INSOMNIA: ICD-10-CM

## 2024-12-06 RX ORDER — ZOLPIDEM TARTRATE 10 MG/1
10 TABLET ORAL
Qty: 30 TABLET | Refills: 0 | Status: SHIPPED | OUTPATIENT
Start: 2024-12-06

## 2024-12-06 NOTE — TELEPHONE ENCOUNTER
Spoke with patient regarding upcoming first appointment, verified appointment date/time Tuesday 12/10/24 at 0830, discussed infusion centers policies and procedures including visitor policy. Pt aware he should take all daily medications as directed and we will check vitals prior to infusion.  patient verbalizes understanding and has no further questions at this time.

## 2024-12-06 NOTE — TELEPHONE ENCOUNTER
Reason for call:   [x] Refill   [] Prior Auth  [] Other:     Office:   [x] PCP/Provider -   [] Specialty/Provider -     Medication: Zolpidem     Dose/Frequency: 10 mg tablet taken by mouth once daily at bedtime PRN for sleep     Quantity: 30    Pharmacy: [id number] - [place] - [phone]     Does the patient have enough for 3 days?   [] Yes   [x] No - Send as HP to POD

## 2024-12-10 ENCOUNTER — HOSPITAL ENCOUNTER (OUTPATIENT)
Dept: INFUSION CENTER | Facility: CLINIC | Age: 59
Discharge: HOME | End: 2024-12-10
Payer: COMMERCIAL

## 2024-12-10 VITALS
SYSTOLIC BLOOD PRESSURE: 115 MMHG | DIASTOLIC BLOOD PRESSURE: 73 MMHG | TEMPERATURE: 97.1 F | RESPIRATION RATE: 18 BRPM | HEART RATE: 62 BPM

## 2024-12-10 DIAGNOSIS — D63.8 ANEMIA IN OTHER CHRONIC DISEASES CLASSIFIED ELSEWHERE: Primary | ICD-10-CM

## 2024-12-10 RX ORDER — CYANOCOBALAMIN 1000 UG/ML
1000 INJECTION, SOLUTION INTRAMUSCULAR; SUBCUTANEOUS ONCE
Status: COMPLETED | OUTPATIENT
Start: 2024-12-10 | End: 2024-12-10

## 2024-12-10 RX ORDER — SODIUM CHLORIDE 9 MG/ML
20 INJECTION, SOLUTION INTRAVENOUS ONCE
Status: CANCELLED | OUTPATIENT
Start: 2024-12-27

## 2024-12-10 RX ORDER — CYANOCOBALAMIN 1000 UG/ML
1000 INJECTION, SOLUTION INTRAMUSCULAR; SUBCUTANEOUS ONCE
Status: CANCELLED | OUTPATIENT
Start: 2025-12-10 | End: 2025-12-10

## 2024-12-10 RX ORDER — CYANOCOBALAMIN 1000 UG/ML
1000 INJECTION, SOLUTION INTRAMUSCULAR; SUBCUTANEOUS ONCE
Status: CANCELLED | OUTPATIENT
Start: 2024-12-27 | End: 2024-12-27

## 2024-12-10 RX ORDER — SODIUM CHLORIDE 9 MG/ML
20 INJECTION, SOLUTION INTRAVENOUS ONCE
Status: COMPLETED | OUTPATIENT
Start: 2024-12-10 | End: 2024-12-10

## 2024-12-10 RX ORDER — SODIUM CHLORIDE 9 MG/ML
20 INJECTION, SOLUTION INTRAVENOUS ONCE
Status: CANCELLED | OUTPATIENT
Start: 2025-12-10

## 2024-12-10 RX ADMIN — CYANOCOBALAMIN 1000 MCG: 1000 INJECTION, SOLUTION INTRAMUSCULAR; SUBCUTANEOUS at 09:02

## 2024-12-10 RX ADMIN — SODIUM CHLORIDE 20 ML/HR: 0.9 INJECTION, SOLUTION INTRAVENOUS at 09:00

## 2024-12-10 RX ADMIN — IRON SUCROSE 100 MG: 20 INJECTION, SOLUTION INTRAVENOUS at 09:00

## 2024-12-10 NOTE — PROGRESS NOTES
Pt here for venofer infusion, and vitamin B12 IM injection offering no complaints. Peripheral IV placed with positive blood return noted. Vitamin B12 IM injection given in the left deltoid. Tolerated infusion and injection without incident. Peripheral IV removed. AVS declined. Walked out in stable condition. Next appointment on 1/7/24 at 12pm.

## 2024-12-19 ENCOUNTER — TELEPHONE (OUTPATIENT)
Dept: INFUSION CENTER | Facility: CLINIC | Age: 59
End: 2024-12-19

## 2024-12-19 DIAGNOSIS — D63.8 ANEMIA IN OTHER CHRONIC DISEASES CLASSIFIED ELSEWHERE: Primary | ICD-10-CM

## 2024-12-19 RX ORDER — CYANOCOBALAMIN 1000 UG/ML
1000 INJECTION, SOLUTION INTRAMUSCULAR; SUBCUTANEOUS ONCE
OUTPATIENT
Start: 2025-01-10 | End: 2025-01-10

## 2024-12-19 RX ORDER — SODIUM CHLORIDE 9 MG/ML
20 INJECTION, SOLUTION INTRAVENOUS ONCE
OUTPATIENT
Start: 2025-01-10

## 2025-01-03 DIAGNOSIS — F51.01 PRIMARY INSOMNIA: ICD-10-CM

## 2025-01-07 RX ORDER — ZOLPIDEM TARTRATE 10 MG/1
10 TABLET ORAL
Qty: 30 TABLET | Refills: 0 | Status: SHIPPED | OUTPATIENT
Start: 2025-01-07

## 2025-01-08 PROBLEM — D50.8 IRON DEFICIENCY ANEMIA SECONDARY TO INADEQUATE DIETARY IRON INTAKE: Status: ACTIVE | Noted: 2024-11-18

## 2025-01-10 ENCOUNTER — HOSPITAL ENCOUNTER (OUTPATIENT)
Dept: INFUSION CENTER | Facility: CLINIC | Age: 60
End: 2025-01-10
Payer: COMMERCIAL

## 2025-01-10 VITALS
HEART RATE: 68 BPM | RESPIRATION RATE: 17 BRPM | DIASTOLIC BLOOD PRESSURE: 76 MMHG | SYSTOLIC BLOOD PRESSURE: 120 MMHG | TEMPERATURE: 98 F | OXYGEN SATURATION: 98 %

## 2025-01-10 DIAGNOSIS — D50.8 IRON DEFICIENCY ANEMIA SECONDARY TO INADEQUATE DIETARY IRON INTAKE: Primary | ICD-10-CM

## 2025-01-10 PROCEDURE — 96372 THER/PROPH/DIAG INJ SC/IM: CPT

## 2025-01-10 PROCEDURE — 96365 THER/PROPH/DIAG IV INF INIT: CPT

## 2025-01-10 RX ORDER — SODIUM CHLORIDE 9 MG/ML
20 INJECTION, SOLUTION INTRAVENOUS ONCE
OUTPATIENT
Start: 2025-02-14

## 2025-01-10 RX ORDER — SODIUM CHLORIDE 9 MG/ML
20 INJECTION, SOLUTION INTRAVENOUS ONCE
Status: COMPLETED | OUTPATIENT
Start: 2025-01-10 | End: 2025-01-10

## 2025-01-10 RX ORDER — CYANOCOBALAMIN 1000 UG/ML
1000 INJECTION, SOLUTION INTRAMUSCULAR; SUBCUTANEOUS ONCE
Status: COMPLETED | OUTPATIENT
Start: 2025-01-10 | End: 2025-01-10

## 2025-01-10 RX ORDER — CYANOCOBALAMIN 1000 UG/ML
1000 INJECTION, SOLUTION INTRAMUSCULAR; SUBCUTANEOUS ONCE
OUTPATIENT
Start: 2025-02-14 | End: 2025-02-14

## 2025-01-10 RX ADMIN — IRON SUCROSE 100 MG: 20 INJECTION, SOLUTION INTRAVENOUS at 08:47

## 2025-01-10 RX ADMIN — SODIUM CHLORIDE 20 ML/HR: 9 INJECTION, SOLUTION INTRAVENOUS at 08:39

## 2025-01-10 RX ADMIN — CYANOCOBALAMIN 1000 MCG: 1000 INJECTION, SOLUTION INTRAMUSCULAR; SUBCUTANEOUS at 08:40

## 2025-01-10 NOTE — PROGRESS NOTES
Pt presents to the infusion center today for IV Venofer and B12 offering no complaints. Pt tolerated B12 to L deltoid and venofer infusion without incident. Pt aware of next appt on 2/14 at 0730.

## 2025-01-12 DIAGNOSIS — I10 PRIMARY HYPERTENSION: Primary | ICD-10-CM

## 2025-01-12 RX ORDER — LOSARTAN POTASSIUM 50 MG/1
50 TABLET ORAL DAILY
Qty: 90 TABLET | Refills: 1 | Status: SHIPPED | OUTPATIENT
Start: 2025-01-12 | End: 2025-01-14 | Stop reason: SDUPTHER

## 2025-01-14 ENCOUNTER — APPOINTMENT (OUTPATIENT)
Dept: LAB | Facility: CLINIC | Age: 60
End: 2025-01-14
Payer: COMMERCIAL

## 2025-01-14 ENCOUNTER — OFFICE VISIT (OUTPATIENT)
Dept: FAMILY MEDICINE CLINIC | Facility: CLINIC | Age: 60
End: 2025-01-14
Payer: COMMERCIAL

## 2025-01-14 VITALS
SYSTOLIC BLOOD PRESSURE: 110 MMHG | BODY MASS INDEX: 22.91 KG/M2 | RESPIRATION RATE: 14 BRPM | HEART RATE: 69 BPM | DIASTOLIC BLOOD PRESSURE: 80 MMHG | OXYGEN SATURATION: 99 % | HEIGHT: 78 IN | WEIGHT: 198 LBS | TEMPERATURE: 98.3 F

## 2025-01-14 DIAGNOSIS — I10 PRIMARY HYPERTENSION: ICD-10-CM

## 2025-01-14 DIAGNOSIS — K21.9 GASTROESOPHAGEAL REFLUX DISEASE WITHOUT ESOPHAGITIS: ICD-10-CM

## 2025-01-14 DIAGNOSIS — R17 ELEVATED BILIRUBIN: ICD-10-CM

## 2025-01-14 DIAGNOSIS — Z00.00 ANNUAL PHYSICAL EXAM: Primary | ICD-10-CM

## 2025-01-14 DIAGNOSIS — D50.8 IRON DEFICIENCY ANEMIA SECONDARY TO INADEQUATE DIETARY IRON INTAKE: ICD-10-CM

## 2025-01-14 DIAGNOSIS — E78.2 MIXED HYPERLIPIDEMIA: ICD-10-CM

## 2025-01-14 LAB
ALBUMIN SERPL BCG-MCNC: 4.3 G/DL (ref 3.5–5)
ALP SERPL-CCNC: 162 U/L (ref 34–104)
ALT SERPL W P-5'-P-CCNC: 20 U/L (ref 7–52)
ANION GAP SERPL CALCULATED.3IONS-SCNC: 6 MMOL/L (ref 4–13)
AST SERPL W P-5'-P-CCNC: 23 U/L (ref 13–39)
BASOPHILS # BLD AUTO: 0.04 THOUSANDS/ΜL (ref 0–0.1)
BASOPHILS NFR BLD AUTO: 1 % (ref 0–1)
BILIRUB SERPL-MCNC: 1.03 MG/DL (ref 0.2–1)
BUN SERPL-MCNC: 14 MG/DL (ref 5–25)
CALCIUM SERPL-MCNC: 8.4 MG/DL (ref 8.4–10.2)
CHLORIDE SERPL-SCNC: 108 MMOL/L (ref 96–108)
CHOLEST SERPL-MCNC: 95 MG/DL (ref ?–200)
CO2 SERPL-SCNC: 29 MMOL/L (ref 21–32)
CREAT SERPL-MCNC: 1.09 MG/DL (ref 0.6–1.3)
CREAT UR-MCNC: 105.9 MG/DL
EOSINOPHIL # BLD AUTO: 0.27 THOUSAND/ΜL (ref 0–0.61)
EOSINOPHIL NFR BLD AUTO: 6 % (ref 0–6)
ERYTHROCYTE [DISTWIDTH] IN BLOOD BY AUTOMATED COUNT: 15.3 % (ref 11.6–15.1)
FERRITIN SERPL-MCNC: 38 NG/ML (ref 24–336)
GFR SERPL CREATININE-BSD FRML MDRD: 74 ML/MIN/1.73SQ M
GLUCOSE P FAST SERPL-MCNC: 95 MG/DL (ref 65–99)
HCT VFR BLD AUTO: 35.8 % (ref 36.5–49.3)
HDLC SERPL-MCNC: 57 MG/DL
HGB BLD-MCNC: 11 G/DL (ref 12–17)
IMM GRANULOCYTES # BLD AUTO: 0.01 THOUSAND/UL (ref 0–0.2)
IMM GRANULOCYTES NFR BLD AUTO: 0 % (ref 0–2)
IRON SATN MFR SERPL: 11 % (ref 15–50)
IRON SERPL-MCNC: 45 UG/DL (ref 50–212)
LDLC SERPL CALC-MCNC: 29 MG/DL (ref 0–100)
LYMPHOCYTES # BLD AUTO: 1.18 THOUSANDS/ΜL (ref 0.6–4.47)
LYMPHOCYTES NFR BLD AUTO: 25 % (ref 14–44)
MCH RBC QN AUTO: 26.3 PG (ref 26.8–34.3)
MCHC RBC AUTO-ENTMCNC: 30.7 G/DL (ref 31.4–37.4)
MCV RBC AUTO: 85 FL (ref 82–98)
MICROALBUMIN UR-MCNC: <7 MG/L
MONOCYTES # BLD AUTO: 0.45 THOUSAND/ΜL (ref 0.17–1.22)
MONOCYTES NFR BLD AUTO: 10 % (ref 4–12)
NEUTROPHILS # BLD AUTO: 2.7 THOUSANDS/ΜL (ref 1.85–7.62)
NEUTS SEG NFR BLD AUTO: 58 % (ref 43–75)
NONHDLC SERPL-MCNC: 38 MG/DL
NRBC BLD AUTO-RTO: 0 /100 WBCS
PLATELET # BLD AUTO: 196 THOUSANDS/UL (ref 149–390)
PMV BLD AUTO: 11.2 FL (ref 8.9–12.7)
POTASSIUM SERPL-SCNC: 4.2 MMOL/L (ref 3.5–5.3)
PROT SERPL-MCNC: 6.9 G/DL (ref 6.4–8.4)
RBC # BLD AUTO: 4.19 MILLION/UL (ref 3.88–5.62)
SODIUM SERPL-SCNC: 143 MMOL/L (ref 135–147)
TIBC SERPL-MCNC: 396.2 UG/DL (ref 250–450)
TRANSFERRIN SERPL-MCNC: 283 MG/DL (ref 203–362)
TRIGL SERPL-MCNC: 43 MG/DL (ref ?–150)
UIBC SERPL-MCNC: 351 UG/DL (ref 155–355)
WBC # BLD AUTO: 4.65 THOUSAND/UL (ref 4.31–10.16)

## 2025-01-14 PROCEDURE — 80061 LIPID PANEL: CPT

## 2025-01-14 PROCEDURE — 36415 COLL VENOUS BLD VENIPUNCTURE: CPT

## 2025-01-14 PROCEDURE — 82728 ASSAY OF FERRITIN: CPT

## 2025-01-14 PROCEDURE — 80053 COMPREHEN METABOLIC PANEL: CPT

## 2025-01-14 PROCEDURE — 82570 ASSAY OF URINE CREATININE: CPT

## 2025-01-14 PROCEDURE — 82043 UR ALBUMIN QUANTITATIVE: CPT

## 2025-01-14 PROCEDURE — 99213 OFFICE O/P EST LOW 20 MIN: CPT | Performed by: NURSE PRACTITIONER

## 2025-01-14 PROCEDURE — 99396 PREV VISIT EST AGE 40-64: CPT | Performed by: NURSE PRACTITIONER

## 2025-01-14 PROCEDURE — 85025 COMPLETE CBC W/AUTO DIFF WBC: CPT

## 2025-01-14 PROCEDURE — 83540 ASSAY OF IRON: CPT

## 2025-01-14 PROCEDURE — 83550 IRON BINDING TEST: CPT

## 2025-01-14 RX ORDER — DILTIAZEM HYDROCHLORIDE 60 MG/1
60 CAPSULE, EXTENDED RELEASE ORAL 2 TIMES DAILY
Qty: 180 CAPSULE | Refills: 1 | Status: SHIPPED | OUTPATIENT
Start: 2025-01-14 | End: 2025-07-13

## 2025-01-14 RX ORDER — LOSARTAN POTASSIUM 50 MG/1
50 TABLET ORAL DAILY
Qty: 90 TABLET | Refills: 1 | Status: SHIPPED | OUTPATIENT
Start: 2025-01-14

## 2025-01-14 RX ORDER — METOPROLOL TARTRATE 25 MG/1
25 TABLET, FILM COATED ORAL EVERY 12 HOURS
Qty: 180 TABLET | Refills: 1 | Status: SHIPPED | OUTPATIENT
Start: 2025-01-14

## 2025-01-14 RX ORDER — PANTOPRAZOLE SODIUM 40 MG/1
40 TABLET, DELAYED RELEASE ORAL DAILY
Qty: 30 TABLET | Refills: 5 | Status: SHIPPED | OUTPATIENT
Start: 2025-01-14 | End: 2025-01-15

## 2025-01-14 NOTE — PATIENT INSTRUCTIONS
"Patient Education     Routine physical for adults   The Basics   Written by the doctors and editors at [facility]   What is a physical? -- A physical is a routine visit, or \"check-up,\" with your doctor. You might also hear it called a \"wellness visit\" or \"preventive visit.\"  During each visit, the doctor will:   Ask about your physical and mental health   Ask about your habits, behaviors, and lifestyle   Do an exam   Give you vaccines if needed   Talk to you about any medicines you take   Give advice about your health   Answer your questions  Getting regular check-ups is an important part of taking care of your health. It can help your doctor find and treat any problems you have. But it's also important for preventing health problems.  A routine physical is different from a \"sick visit.\" A sick visit is when you see a doctor because of a health concern or problem. Since physicals are scheduled ahead of time, you can think about what you want to ask the doctor.  How often should I get a physical? -- It depends on your age and health. In general, for people age 21 years and older:   If you are younger than 50 years, you might be able to get a physical every 3 years.   If you are 50 years or older, your doctor might recommend a physical every year.  If you have an ongoing health condition, like diabetes or high blood pressure, your doctor will probably want to see you more often.  What happens during a physical? -- In general, each visit will include:   Physical exam - The doctor or nurse will check your height, weight, heart rate, and blood pressure. They will also look at your eyes and ears. They will ask about how you are feeling and whether you have any symptoms that bother you.   Medicines - It's a good idea to bring a list of all the medicines you take to each doctor visit. Your doctor will talk to you about your medicines and answer any questions. Tell them if you are having any side effects that bother you. You " "should also tell them if you are having trouble paying for any of your medicines.   Habits and behaviors - This includes:   Your diet   Your exercise habits   Whether you smoke, drink alcohol, or use drugs   Whether you are sexually active   Whether you feel safe at home  Your doctor will talk to you about things you can do to improve your health and lower your risk of health problems. They will also offer help and support. For example, if you want to quit smoking, they can give you advice and might prescribe medicines. If you want to improve your diet or get more physical activity, they can help you with this, too.   Lab tests, if needed - The tests you get will depend on your age and situation. For example, your doctor might want to check your:   Cholesterol   Blood sugar   Iron level   Vaccines - The recommended vaccines will depend on your age, health, and what vaccines you already had. Vaccines are very important because they can prevent certain serious or deadly infections.   Discussion of screening - \"Screening\" means checking for diseases or other health problems before they cause symptoms. Your doctor can recommend screening based on your age, risk, and preferences. This might include tests to check for:   Cancer, such as breast, prostate, cervical, ovarian, colorectal, prostate, lung, or skin cancer   Sexually transmitted infections, such as chlamydia and gonorrhea   Mental health conditions like depression and anxiety  Your doctor will talk to you about the different types of screening tests. They can help you decide which screenings to have. They can also explain what the results might mean.   Answering questions - The physical is a good time to ask the doctor or nurse questions about your health. If needed, they can refer you to other doctors or specialists, too.  Adults older than 65 years often need other care, too. As you get older, your doctor will talk to you about:   How to prevent falling at " home   Hearing or vision tests   Memory testing   How to take your medicines safely   Making sure that you have the help and support you need at home  All topics are updated as new evidence becomes available and our peer review process is complete.  This topic retrieved from [email or web address] on: May 02, 2024.  Topic 191209 Version 1.0  Release: 32.4.3 - C32.122  © 2024 UpToDate, Inc. and/or its affiliates. All rights reserved.  Consumer Information Use and Disclaimer   Disclaimer: This generalized information is a limited summary of diagnosis, treatment, and/or medication information. It is not meant to be comprehensive and should be used as a tool to help the user understand and/or assess potential diagnostic and treatment options. It does NOT include all information about conditions, treatments, medications, side effects, or risks that may apply to a specific patient. It is not intended to be medical advice or a substitute for the medical advice, diagnosis, or treatment of a health care provider based on the health care provider's examination and assessment of a patient's specific and unique circumstances. Patients must speak with a health care provider for complete information about their health, medical questions, and treatment options, including any risks or benefits regarding use of medications. This information does not endorse any treatments or medications as safe, effective, or approved for treating a specific patient. UpToDate, Inc. and its affiliates disclaim any warranty or liability relating to this information or the use thereof.The use of this information is governed by the Terms of Use, available at https://www.woltersOree Advanced Illum[employer] Solutionsuwer.com/en/know/clinical-effectiveness-terms. 2024© UpToDate, Inc. and its affiliates and/or licensors. All rights reserved.  Copyright   © 2024 UpToDate, Inc. and/or its affiliates. All rights reserved.

## 2025-01-14 NOTE — ASSESSMENT & PLAN NOTE
Annual physical completed.  Up-to-date with screenings.  Generally doing well.  Is getting monthly iron infusions.  Will recheck levels.  Discussed continuing intake of iron rich foods.

## 2025-01-14 NOTE — PROGRESS NOTES
Adult Annual Physical  Name: Jurgen Rocha      : 1965      MRN: [id number]  Encounter Provider: HANNA Tapia  Encounter Date: 2025   Encounter department: [facility]    Assessment & Plan  Annual physical exam  Annual physical completed.  Up-to-date with screenings.  Generally doing well.  Is getting monthly iron infusions.  Will recheck levels.  Discussed continuing intake of iron rich foods.       Primary hypertension  Pressure is at goal.  Continue medication.  Blood work ordered.  Orders:    Comprehensive metabolic panel; Future    Albumin / creatinine urine ratio; Future    diltiazem (CARDIZEM SR) 60 mg 12 hr capsule; Take 1 capsule (60 mg total) by mouth 2 (two) times a day    losartan (COZAAR) 50 mg tablet; Take 1 tablet (50 mg total) by mouth daily    metoprolol tartrate (LOPRESSOR) 25 mg tablet; Take 1 tablet (25 mg total) by mouth every 12 (twelve) hours    Mixed hyperlipidemia  Blood work ordered.  Continue heart healthy diet.  Orders:    Lipid panel; Future    Iron deficiency anemia secondary to inadequate dietary iron intake  Patient has been receiving monthly iron infusions.  Has 2 more doses.  Blood work ordered.  Orders:    CBC and differential; Future    Iron Panel (Includes Ferritin, Iron Sat%, Iron, and TIBC); Future    Elevated bilirubin  Patient reports that he has not had any alcohol intake for more than 3 months.  Will recheck labs.  Orders:    Comprehensive metabolic panel; Future    Gastroesophageal reflux disease without esophagitis  Continue Protonix as needed.  Decrease intake of foods that trigger his acid reflux.  Orders:    pantoprazole (PROTONIX) 40 mg tablet; Take 1 tablet (40 mg total) by mouth daily    Immunizations and preventive care screenings were discussed with patient today. Appropriate education was printed on patient's after visit summary.        Counseling:  Alcohol/drug use: discussed moderation in alcohol intake, the  "recommendations for healthy alcohol use, and avoidance of illicit drug use.  Dental Health: discussed importance of regular tooth brushing, flossing, and dental visits.  Injury prevention: discussed safety/seat belts, safety helmets, smoke detectors, carbon monoxide detectors, and smoking near bedding or upholstery.  Sexual health: discussed sexually transmitted diseases, partner selection, use of condoms, avoidance of unintended pregnancy, and contraceptive alternatives.  Exercise: the importance of regular exercise/physical activity was discussed. Recommend exercise 3-5 times per week for at least 30 minutes.       Depression Screening and Follow-up Plan: Patient was screened for depression during today's encounter. They screened negative with a PHQ-2 score of 0.        History of Present Illness     Adult Annual Physical:  Patient presents for annual physical.     Diet and Physical Activity:  - Diet/Nutrition: well balanced diet.  - Exercise: walking.    Depression Screening:  - PHQ-2 Score: 0    General Health:  - Sleep: sleeps well.  - Hearing: normal hearing bilateral ears.  - Vision: wears glasses. readin  - Dental: brushes teeth twice daily and regular dental visits.    Review of Systems      Objective   /80 (BP Location: Left arm, Patient Position: Sitting, Cuff Size: Large)   Pulse 69   Temp 98.3 °F (36.8 °C) (Temporal)   Resp 14   Ht 6' 6\" (1.981 m)   Wt 89.8 kg (198 lb)   SpO2 99%   BMI 22.88 kg/m²     Physical Exam  Vitals and nursing note reviewed.   Constitutional:       General: He is not in acute distress.     Appearance: Normal appearance. He is well-developed.   HENT:      Head: Normocephalic and atraumatic.   Eyes:      Conjunctiva/sclera: Conjunctivae normal.   Cardiovascular:      Rate and Rhythm: Normal rate and regular rhythm.      Pulses: Normal pulses.      Heart sounds: Normal heart sounds. No murmur heard.  Pulmonary:      Effort: Pulmonary effort is normal. No respiratory " distress.      Breath sounds: Normal breath sounds.   Abdominal:      General: Abdomen is flat.      Palpations: Abdomen is soft.      Tenderness: There is no abdominal tenderness.   Musculoskeletal:         General: No swelling. Normal range of motion.      Cervical back: Normal range of motion and neck supple.   Skin:     General: Skin is warm and dry.      Capillary Refill: Capillary refill takes less than 2 seconds.   Neurological:      General: No focal deficit present.      Mental Status: He is alert and oriented to person, place, and time.   Psychiatric:         Mood and Affect: Mood normal.         Behavior: Behavior normal.         Thought Content: Thought content normal.         Judgment: Judgment normal.

## 2025-01-14 NOTE — ASSESSMENT & PLAN NOTE
Patient reports that he has not had any alcohol intake for more than 3 months.  Will recheck labs.  Orders:    Comprehensive metabolic panel; Future

## 2025-01-14 NOTE — ASSESSMENT & PLAN NOTE
Patient has been receiving monthly iron infusions.  Has 2 more doses.  Blood work ordered.  Orders:    CBC and differential; Future    Iron Panel (Includes Ferritin, Iron Sat%, Iron, and TIBC); Future

## 2025-01-15 ENCOUNTER — RESULTS FOLLOW-UP (OUTPATIENT)
Dept: FAMILY MEDICINE CLINIC | Facility: CLINIC | Age: 60
End: 2025-01-15

## 2025-01-15 RX ORDER — PANTOPRAZOLE SODIUM 40 MG/1
40 TABLET, DELAYED RELEASE ORAL DAILY
Qty: 90 TABLET | Refills: 1 | Status: SHIPPED | OUTPATIENT
Start: 2025-01-15

## 2025-01-15 NOTE — TELEPHONE ENCOUNTER
----- Message from HANNA Tapia sent at 1/15/2025  6:59 AM EST -----  Some improvement with bilirubin and alkaline phosphatase. Iron level is alos slowly improving. Continue iron infusions, continue heart healthy diet, continue with alcohol cessation. Cholesterol level is good

## 2025-02-04 DIAGNOSIS — F51.01 PRIMARY INSOMNIA: ICD-10-CM

## 2025-02-04 RX ORDER — ZOLPIDEM TARTRATE 10 MG/1
10 TABLET ORAL
Qty: 30 TABLET | Refills: 0 | Status: SHIPPED | OUTPATIENT
Start: 2025-02-04

## 2025-02-14 ENCOUNTER — HOSPITAL ENCOUNTER (OUTPATIENT)
Dept: INFUSION CENTER | Facility: CLINIC | Age: 60
End: 2025-02-14
Payer: COMMERCIAL

## 2025-02-14 VITALS
HEART RATE: 63 BPM | SYSTOLIC BLOOD PRESSURE: 114 MMHG | DIASTOLIC BLOOD PRESSURE: 74 MMHG | TEMPERATURE: 96.9 F | RESPIRATION RATE: 16 BRPM

## 2025-02-14 DIAGNOSIS — D50.8 IRON DEFICIENCY ANEMIA SECONDARY TO INADEQUATE DIETARY IRON INTAKE: Primary | ICD-10-CM

## 2025-02-14 PROCEDURE — 96372 THER/PROPH/DIAG INJ SC/IM: CPT

## 2025-02-14 PROCEDURE — 96365 THER/PROPH/DIAG IV INF INIT: CPT

## 2025-02-14 RX ORDER — CYANOCOBALAMIN 1000 UG/ML
1000 INJECTION, SOLUTION INTRAMUSCULAR; SUBCUTANEOUS ONCE
OUTPATIENT
Start: 2025-03-14 | End: 2025-03-14

## 2025-02-14 RX ORDER — SODIUM CHLORIDE 9 MG/ML
20 INJECTION, SOLUTION INTRAVENOUS ONCE
Status: COMPLETED | OUTPATIENT
Start: 2025-02-14 | End: 2025-02-14

## 2025-02-14 RX ORDER — CYANOCOBALAMIN 1000 UG/ML
1000 INJECTION, SOLUTION INTRAMUSCULAR; SUBCUTANEOUS ONCE
Status: COMPLETED | OUTPATIENT
Start: 2025-02-14 | End: 2025-02-14

## 2025-02-14 RX ORDER — SODIUM CHLORIDE 9 MG/ML
20 INJECTION, SOLUTION INTRAVENOUS ONCE
OUTPATIENT
Start: 2025-03-14

## 2025-02-14 RX ADMIN — SODIUM CHLORIDE 20 ML/HR: 9 INJECTION, SOLUTION INTRAVENOUS at 07:55

## 2025-02-14 RX ADMIN — CYANOCOBALAMIN 1000 MCG: 1000 INJECTION INTRAMUSCULAR; SUBCUTANEOUS at 07:51

## 2025-02-14 RX ADMIN — IRON SUCROSE 100 MG: 20 INJECTION, SOLUTION INTRAVENOUS at 07:50

## 2025-02-14 NOTE — PROGRESS NOTES
Pt here for IV venofer and B12, offering no complaints. PIV established with positive blood return noted. Tolerated entire infusion and B12 to L deltoid without complications. PIV flushed and removed. Calendar provided. Next appt 3/14 730am. Walked out in stable condition.

## 2025-02-15 ENCOUNTER — RESULTS FOLLOW-UP (OUTPATIENT)
Age: 60
End: 2025-02-15

## 2025-03-04 DIAGNOSIS — F51.01 PRIMARY INSOMNIA: ICD-10-CM

## 2025-03-04 RX ORDER — ZOLPIDEM TARTRATE 10 MG/1
10 TABLET ORAL
Qty: 30 TABLET | Refills: 0 | Status: SHIPPED | OUTPATIENT
Start: 2025-03-04

## 2025-03-14 ENCOUNTER — HOSPITAL ENCOUNTER (OUTPATIENT)
Dept: INFUSION CENTER | Facility: CLINIC | Age: 60
End: 2025-03-14
Payer: COMMERCIAL

## 2025-03-14 VITALS
TEMPERATURE: 97.8 F | HEART RATE: 64 BPM | SYSTOLIC BLOOD PRESSURE: 116 MMHG | DIASTOLIC BLOOD PRESSURE: 73 MMHG | RESPIRATION RATE: 18 BRPM

## 2025-03-14 DIAGNOSIS — D50.8 IRON DEFICIENCY ANEMIA SECONDARY TO INADEQUATE DIETARY IRON INTAKE: Primary | ICD-10-CM

## 2025-03-14 PROCEDURE — 96372 THER/PROPH/DIAG INJ SC/IM: CPT

## 2025-03-14 PROCEDURE — 96365 THER/PROPH/DIAG IV INF INIT: CPT

## 2025-03-14 RX ORDER — CYANOCOBALAMIN 1000 UG/ML
1000 INJECTION, SOLUTION INTRAMUSCULAR; SUBCUTANEOUS ONCE
OUTPATIENT
Start: 2025-04-11 | End: 2025-04-11

## 2025-03-14 RX ORDER — SODIUM CHLORIDE 9 MG/ML
20 INJECTION, SOLUTION INTRAVENOUS ONCE
Status: COMPLETED | OUTPATIENT
Start: 2025-03-14 | End: 2025-03-14

## 2025-03-14 RX ORDER — SODIUM CHLORIDE 9 MG/ML
20 INJECTION, SOLUTION INTRAVENOUS ONCE
OUTPATIENT
Start: 2025-04-11

## 2025-03-14 RX ORDER — CYANOCOBALAMIN 1000 UG/ML
1000 INJECTION, SOLUTION INTRAMUSCULAR; SUBCUTANEOUS ONCE
Status: COMPLETED | OUTPATIENT
Start: 2025-03-14 | End: 2025-03-14

## 2025-03-14 RX ADMIN — SODIUM CHLORIDE 20 ML/HR: 0.9 INJECTION, SOLUTION INTRAVENOUS at 07:54

## 2025-03-14 RX ADMIN — IRON SUCROSE 100 MG: 20 INJECTION, SOLUTION INTRAVENOUS at 07:56

## 2025-03-14 RX ADMIN — CYANOCOBALAMIN 1000 MCG: 1000 INJECTION INTRAMUSCULAR; SUBCUTANEOUS at 07:54

## 2025-03-14 NOTE — PROGRESS NOTES
Pt here venofer, offering no complaints.  Left arm IV placed with positive blood return noted.  Tolerated infusion without incident.  PIV removed.  B12 injection given in the left deltoid AVS declined.  Aware of next appt 4/11 @ 730 am.  Walked out in stable condition.

## 2025-04-03 DIAGNOSIS — F51.01 PRIMARY INSOMNIA: ICD-10-CM

## 2025-04-04 RX ORDER — ZOLPIDEM TARTRATE 10 MG/1
10 TABLET ORAL
Qty: 30 TABLET | Refills: 0 | Status: SHIPPED | OUTPATIENT
Start: 2025-04-04

## 2025-04-11 ENCOUNTER — HOSPITAL ENCOUNTER (OUTPATIENT)
Dept: INFUSION CENTER | Facility: CLINIC | Age: 60
End: 2025-04-11
Payer: COMMERCIAL

## 2025-04-11 VITALS
RESPIRATION RATE: 18 BRPM | TEMPERATURE: 97.4 F | HEART RATE: 63 BPM | SYSTOLIC BLOOD PRESSURE: 118 MMHG | DIASTOLIC BLOOD PRESSURE: 77 MMHG

## 2025-04-11 DIAGNOSIS — D50.8 IRON DEFICIENCY ANEMIA SECONDARY TO INADEQUATE DIETARY IRON INTAKE: Primary | ICD-10-CM

## 2025-04-11 PROCEDURE — 96372 THER/PROPH/DIAG INJ SC/IM: CPT

## 2025-04-11 PROCEDURE — 96365 THER/PROPH/DIAG IV INF INIT: CPT

## 2025-04-11 RX ORDER — CYANOCOBALAMIN 1000 UG/ML
1000 INJECTION, SOLUTION INTRAMUSCULAR; SUBCUTANEOUS ONCE
OUTPATIENT
Start: 2025-05-09 | End: 2025-05-09

## 2025-04-11 RX ORDER — CYANOCOBALAMIN 1000 UG/ML
1000 INJECTION, SOLUTION INTRAMUSCULAR; SUBCUTANEOUS ONCE
Status: COMPLETED | OUTPATIENT
Start: 2025-04-11 | End: 2025-04-11

## 2025-04-11 RX ORDER — SODIUM CHLORIDE 9 MG/ML
20 INJECTION, SOLUTION INTRAVENOUS ONCE
Status: COMPLETED | OUTPATIENT
Start: 2025-04-11 | End: 2025-04-11

## 2025-04-11 RX ORDER — SODIUM CHLORIDE 9 MG/ML
20 INJECTION, SOLUTION INTRAVENOUS ONCE
OUTPATIENT
Start: 2025-05-09

## 2025-04-11 RX ADMIN — SODIUM CHLORIDE 20 ML/HR: 9 INJECTION, SOLUTION INTRAVENOUS at 07:38

## 2025-04-11 RX ADMIN — CYANOCOBALAMIN 1000 MCG: 1000 INJECTION INTRAMUSCULAR; SUBCUTANEOUS at 07:46

## 2025-04-11 RX ADMIN — IRON SUCROSE 100 MG: 20 INJECTION, SOLUTION INTRAVENOUS at 07:40

## 2025-04-11 NOTE — PROGRESS NOTES
Pt here today for an iron infusion, offers no complaints, infusion completed w/o complications, aware of there next appt on 5/9 at 7:30, AVS given and pt D/C home

## 2025-05-01 ENCOUNTER — OFFICE VISIT (OUTPATIENT)
Dept: GASTROENTEROLOGY | Facility: CLINIC | Age: 60
End: 2025-05-01
Payer: COMMERCIAL

## 2025-05-01 ENCOUNTER — PREP FOR PROCEDURE (OUTPATIENT)
Dept: GASTROENTEROLOGY | Facility: CLINIC | Age: 60
End: 2025-05-01

## 2025-05-01 VITALS
HEIGHT: 77 IN | BODY MASS INDEX: 23.14 KG/M2 | HEART RATE: 54 BPM | DIASTOLIC BLOOD PRESSURE: 67 MMHG | SYSTOLIC BLOOD PRESSURE: 117 MMHG | WEIGHT: 196 LBS | TEMPERATURE: 97.8 F | OXYGEN SATURATION: 100 %

## 2025-05-01 DIAGNOSIS — D50.9 IRON DEFICIENCY ANEMIA, UNSPECIFIED IRON DEFICIENCY ANEMIA TYPE: Primary | ICD-10-CM

## 2025-05-01 DIAGNOSIS — K76.0 FATTY LIVER: ICD-10-CM

## 2025-05-01 DIAGNOSIS — D50.9 IRON DEFICIENCY ANEMIA, UNSPECIFIED IRON DEFICIENCY ANEMIA TYPE: ICD-10-CM

## 2025-05-01 DIAGNOSIS — Z90.49 HISTORY OF CHOLECYSTECTOMY: ICD-10-CM

## 2025-05-01 DIAGNOSIS — R19.7 DIARRHEA, UNSPECIFIED TYPE: Primary | ICD-10-CM

## 2025-05-01 PROCEDURE — 99214 OFFICE O/P EST MOD 30 MIN: CPT | Performed by: INTERNAL MEDICINE

## 2025-05-01 RX ORDER — COLESTIPOL HYDROCHLORIDE 1 G/1
2 TABLET ORAL 2 TIMES DAILY
Qty: 120 TABLET | Refills: 3 | Status: SHIPPED | OUTPATIENT
Start: 2025-05-01

## 2025-05-01 NOTE — PROGRESS NOTES
Name: Jurgen Rocha      : 1965      MRN: [id number]  Encounter Provider: Remy Keene MD  Encounter Date: 2025   Encounter department: [facility] [place]  :  Assessment & Plan  Diarrhea, unspecified type  Likely in the setting of cholecystectomy.  Paulamatthew has been working however he would prefer pills.    Will switch to Colestid given patient preference for tablet  Low-fat diet  Orders:    colestipol (COLESTID) 1 g tablet; Take 2 tablets (2 g total) by mouth 2 (two) times a day    History of cholecystectomy    Orders:    colestipol (COLESTID) 1 g tablet; Take 2 tablets (2 g total) by mouth 2 (two) times a day    Fatty liver  Has abstained from alcohol.  No obvious signs or symptoms of cirrhosis.    Check ultrasound elastography to evaluate:  Orders:    US elastography/UGAP; Future    Iron deficiency anemia, unspecified iron deficiency anemia type  Likely secondary gastric bypass.  Previous EGD and colonoscopy without source for anemia.    Check video capsule endoscopy to rule out small bowel source  Colonoscopy   Outpatient iron infusions with PCP           History of Present Illness   Jurgen Rocha is a 59 y.o. male who presents for follow-up.  Overall doing well.  HPI  History obtained from: patient  Review of Systems A complete review of systems is negative other than that noted above in the HPI.    Medical History Reviewed by provider this encounter:     .  Past Medical History   Past Medical History:   Diagnosis Date    A-fib (HCC)     Alcoholism (HCC) 2022    Anemia 10 2019    Clotting disorder (HCC) 10 2019    CPAP (continuous positive airway pressure) dependence     GI (gastrointestinal bleed) February    History of transfusion 2022    Sleep apnea     Visual impairment 2020     Past Surgical History:   Procedure Laterality Date    CHOLECYSTECTOMY  2018    Gastric bypass    COLONOSCOPY      EGD      GASTRIC BYPASS      HERNIA REPAIR  Right 10/18/2023    Procedure: REPAIR HERNIA INGUINAL, LAPAROSCOPIC;  Surgeon: Chris Fonseca DO;  Location: [facility];  Service: General    LEFT ATRIAL APPENDAGE OCCLUSION      Watchman     History reviewed. No pertinent family history.   reports that he has quit smoking. His smoking use included cigars. He has never been exposed to tobacco smoke. He has never used smokeless tobacco. He reports that he does not currently use alcohol. He reports that he does not currently use drugs after having used the following drugs: Marijuana.  Current Outpatient Medications   Medication Instructions    aspirin (ECOTRIN LOW STRENGTH) 81 mg    colestipol (COLESTID) 2 g, Oral, 2 times daily    Creon [id number] units CPEP TAKE ONE CAPSULE BY MOUTH THREE TIMES DAILY BEFORE A MEAL AND SNACKS    diltiazem (CARDIZEM SR) 60 mg, Oral, 2 times daily    losartan (COZAAR) 50 mg, Oral, Daily    metoprolol tartrate (LOPRESSOR) 25 mg, Oral, Every 12 hours    pantoprazole (PROTONIX) 40 mg, Oral, Daily    sildenafil (VIAGRA) 50 mg, Oral, Daily PRN    zolpidem (AMBIEN) 10 mg, Oral, Daily at bedtime PRN   No Known Allergies   Current Outpatient Medications on File Prior to Visit   Medication Sig Dispense Refill    aspirin (ECOTRIN LOW STRENGTH) 81 mg EC tablet Take 81 mg by mouth      diltiazem (CARDIZEM SR) 60 mg 12 hr capsule Take 1 capsule (60 mg total) by mouth 2 (two) times a day 180 capsule 1    losartan (COZAAR) 50 mg tablet Take 1 tablet (50 mg total) by mouth daily 90 tablet 1    metoprolol tartrate (LOPRESSOR) 25 mg tablet Take 1 tablet (25 mg total) by mouth every 12 (twelve) hours 180 tablet 1    pantoprazole (PROTONIX) 40 mg tablet TAKE 1 TABLET(40 MG) BY MOUTH DAILY 90 tablet 1    sildenafil (VIAGRA) 50 MG tablet Take 1 tablet (50 mg total) by mouth daily as needed for erectile dysfunction 10 tablet 0    zolpidem (AMBIEN) 10 mg tablet Take 1 tablet (10 mg total) by mouth daily at bedtime as needed for sleep 30 tablet 0     "[DISCONTINUED] cholestyramine (QUESTRAN) 4 g packet MIX AND DRINK 1 PACKET(4 GRAMS) BY MOUTH TWICE DAILY WITH MEALS 180 each 0    Creon 20621-006199 units CPEP TAKE ONE CAPSULE BY MOUTH THREE TIMES DAILY BEFORE A MEAL AND SNACKS (Patient not taking: Reported on 5/1/2025)       No current facility-administered medications on file prior to visit.      Current Outpatient Medications   Medication Sig Dispense Refill    aspirin (ECOTRIN LOW STRENGTH) 81 mg EC tablet Take 81 mg by mouth      colestipol (COLESTID) 1 g tablet Take 2 tablets (2 g total) by mouth 2 (two) times a day 120 tablet 3    diltiazem (CARDIZEM SR) 60 mg 12 hr capsule Take 1 capsule (60 mg total) by mouth 2 (two) times a day 180 capsule 1    losartan (COZAAR) 50 mg tablet Take 1 tablet (50 mg total) by mouth daily 90 tablet 1    metoprolol tartrate (LOPRESSOR) 25 mg tablet Take 1 tablet (25 mg total) by mouth every 12 (twelve) hours 180 tablet 1    pantoprazole (PROTONIX) 40 mg tablet TAKE 1 TABLET(40 MG) BY MOUTH DAILY 90 tablet 1    sildenafil (VIAGRA) 50 MG tablet Take 1 tablet (50 mg total) by mouth daily as needed for erectile dysfunction 10 tablet 0    zolpidem (AMBIEN) 10 mg tablet Take 1 tablet (10 mg total) by mouth daily at bedtime as needed for sleep 30 tablet 0    Creon 34810-686999 units CPEP TAKE ONE CAPSULE BY MOUTH THREE TIMES DAILY BEFORE A MEAL AND SNACKS (Patient not taking: Reported on 5/1/2025)       No current facility-administered medications for this visit.     Objective   /67 (BP Location: Left arm, Patient Position: Sitting, Cuff Size: Large)   Pulse (!) 54   Temp 97.8 °F (36.6 °C) (Tympanic)   Ht 6' 5\" (1.956 m)   Wt 88.9 kg (196 lb)   SpO2 100%   BMI 23.24 kg/m²     Physical Exam     Physical Exam:   GENERAL: NAD  HEENT:  NC/AT, PERRL, EOMI, MMM, no scleral icterus  CARDIAC:  RRR, +S1/S2, no S3/S4 heard, no m/g/r  PULMONARY:  CTA B/L, no wheezing/rales/rhonci, non-labored breathing  ABDOMEN:  Soft, NT/ND, +BS, no " rebound/guarding/rigidity  Extremities:  2+ Pulses in DP/PT. No edema, cyanosis, or clubbing  NEUROLOGIC:  Alert/oriented x3. No motor or sensory deficits  SKIN:  No rashes or erythema        Lab Results: I personally reviewed relevant lab results. CBC/BMP: No new results in last 24 hours. , Creatinine Clearance: CrCl cannot be calculated (Patient's most recent lab result is older than the maximum 7 days allowed.)., LFTs: No new results in last 24 hours. , PTT/INR:No new results in last 24 hours.     Radiology Results Review: I have reviewed the following images/report studies in PACS: No results found.   Results for orders placed during the hospital encounter of 12/29/23    Colonoscopy    Impression  One 10 mm polyp in the hepatic flexure; performed cold snare removal  Diverticulosis of mild severity  Performed forceps biopsies in the ascending colon, transverse colon and descending colon  Floppy left-side of colon  Fair bowel prep.  Extensive cleaning was used.        RECOMMENDATION:  Repeat colonoscopy in 3 years  Personal history of colon polyps    Await pathology results  This interval is recommended also due to fair bowel prep.            Remy Keene MD

## 2025-05-03 DIAGNOSIS — F51.01 PRIMARY INSOMNIA: ICD-10-CM

## 2025-05-06 ENCOUNTER — PROCEDURE VISIT (OUTPATIENT)
Dept: GASTROENTEROLOGY | Facility: CLINIC | Age: 60
End: 2025-05-06
Attending: INTERNAL MEDICINE
Payer: COMMERCIAL

## 2025-05-06 DIAGNOSIS — Z01.89: Primary | ICD-10-CM

## 2025-05-06 DIAGNOSIS — D50.9 IRON DEFICIENCY ANEMIA, UNSPECIFIED IRON DEFICIENCY ANEMIA TYPE: ICD-10-CM

## 2025-05-06 RX ORDER — ZOLPIDEM TARTRATE 10 MG/1
10 TABLET ORAL
Qty: 30 TABLET | Refills: 0 | Status: SHIPPED | OUTPATIENT
Start: 2025-05-06

## 2025-05-09 ENCOUNTER — HOSPITAL ENCOUNTER (OUTPATIENT)
Dept: INFUSION CENTER | Facility: CLINIC | Age: 60
End: 2025-05-09
Payer: COMMERCIAL

## 2025-05-09 VITALS
HEART RATE: 64 BPM | SYSTOLIC BLOOD PRESSURE: 106 MMHG | TEMPERATURE: 97.6 F | RESPIRATION RATE: 18 BRPM | DIASTOLIC BLOOD PRESSURE: 72 MMHG

## 2025-05-09 DIAGNOSIS — D50.8 IRON DEFICIENCY ANEMIA SECONDARY TO INADEQUATE DIETARY IRON INTAKE: Primary | ICD-10-CM

## 2025-05-09 PROCEDURE — 91110 GI TRC IMG INTRAL ESOPH-ILE: CPT | Performed by: INTERNAL MEDICINE

## 2025-05-09 PROCEDURE — 96372 THER/PROPH/DIAG INJ SC/IM: CPT

## 2025-05-09 PROCEDURE — 96365 THER/PROPH/DIAG IV INF INIT: CPT

## 2025-05-09 RX ORDER — CYANOCOBALAMIN 1000 UG/ML
1000 INJECTION, SOLUTION INTRAMUSCULAR; SUBCUTANEOUS ONCE
Status: CANCELLED | OUTPATIENT
Start: 2025-05-09 | End: 2025-05-09

## 2025-05-09 RX ORDER — CYANOCOBALAMIN 1000 UG/ML
1000 INJECTION, SOLUTION INTRAMUSCULAR; SUBCUTANEOUS ONCE
Status: COMPLETED | OUTPATIENT
Start: 2025-05-09 | End: 2025-05-09

## 2025-05-09 RX ORDER — SODIUM CHLORIDE 9 MG/ML
20 INJECTION, SOLUTION INTRAVENOUS ONCE
Status: CANCELLED | OUTPATIENT
Start: 2025-05-09

## 2025-05-09 RX ORDER — SODIUM CHLORIDE 9 MG/ML
20 INJECTION, SOLUTION INTRAVENOUS ONCE
Status: COMPLETED | OUTPATIENT
Start: 2025-05-09 | End: 2025-05-09

## 2025-05-09 RX ADMIN — CYANOCOBALAMIN 1000 MCG: 1000 INJECTION INTRAMUSCULAR; SUBCUTANEOUS at 07:51

## 2025-05-09 RX ADMIN — SODIUM CHLORIDE 20 ML/HR: 9 INJECTION, SOLUTION INTRAVENOUS at 07:51

## 2025-05-09 RX ADMIN — IRON SUCROSE 100 MG: 20 INJECTION, SOLUTION INTRAVENOUS at 07:50

## 2025-05-09 NOTE — PROGRESS NOTES
Patient here for venofer, patient states he takes antibiotics from the dentist to treat an infection started on 5/8/25, reached out to Rebecca Hamilton NP in the office and ok to proceed today.     PIV established in the LFA with blood return and tolerated entire treatment with no incident. PIV removed and bandaid placed.   No further appointments, patient is aware of this.   AVS declined.   Patient walked out of clinic with no incident.

## 2025-05-12 NOTE — PROGRESS NOTES
Small bowel capsule endoscopy: Normal small bowel.  Capsule does not reach cecum.  KUB in 2 weeks.

## 2025-05-15 ENCOUNTER — TELEPHONE (OUTPATIENT)
Dept: GASTROENTEROLOGY | Facility: CLINIC | Age: 60
End: 2025-05-15

## 2025-05-27 ENCOUNTER — TELEPHONE (OUTPATIENT)
Dept: GASTROENTEROLOGY | Facility: CLINIC | Age: 60
End: 2025-05-27

## 2025-06-01 ENCOUNTER — HOSPITAL ENCOUNTER (OUTPATIENT)
Dept: ULTRASOUND IMAGING | Facility: HOSPITAL | Age: 60
Discharge: HOME | End: 2025-06-01
Attending: INTERNAL MEDICINE
Payer: COMMERCIAL

## 2025-06-01 DIAGNOSIS — K76.0 FATTY LIVER: ICD-10-CM

## 2025-06-01 PROCEDURE — 76981 USE PARENCHYMA: CPT

## 2025-06-03 DIAGNOSIS — F51.01 PRIMARY INSOMNIA: ICD-10-CM

## 2025-06-03 DIAGNOSIS — N52.8 OTHER MALE ERECTILE DYSFUNCTION: ICD-10-CM

## 2025-06-04 ENCOUNTER — TELEPHONE (OUTPATIENT)
Age: 60
End: 2025-06-04

## 2025-06-04 RX ORDER — SILDENAFIL 50 MG/1
50 TABLET, FILM COATED ORAL DAILY PRN
Qty: 10 TABLET | Refills: 0 | Status: SHIPPED | OUTPATIENT
Start: 2025-06-04

## 2025-06-04 RX ORDER — ZOLPIDEM TARTRATE 10 MG/1
10 TABLET ORAL
Qty: 30 TABLET | Refills: 0 | Status: SHIPPED | OUTPATIENT
Start: 2025-06-04

## 2025-06-04 NOTE — TELEPHONE ENCOUNTER
PA for  sildenafil (VIAGRA) 50 MG tablet APPROVED       Date(s) approved 5/5/25-6/4/26    Case #[id number]     Patient advised by          []Pixelatedhart Message  [x]Phone call   []LMOM  []L/M to call office as no active Communication consent on file  []Unable to leave detailed message as VM not approved on Communication consent       Pharmacy advised by    []Fax  []Phone call  []Secure Chat    Specialty Pharmacy    []     Approval letter scanned into Media No

## 2025-06-04 NOTE — TELEPHONE ENCOUNTER
PA for sildenafil (VIAGRA) 50 MG tablet SUBMITTED to     via    []CMM-KEY:   [x]Surescripts-Case ID # [id number]   []Availity-Auth ID # NDC #   []Faxed to plan   []Other website   []Phone call Case ID #     [x]PA sent as URGENT    All office notes, labs and other pertaining documents and studies sent. Clinical questions answered. Awaiting determination from insurance company.     Turnaround time for your insurance to make a decision on your Prior Authorization can take 7-21 business days.

## 2025-06-09 ENCOUNTER — RESULTS FOLLOW-UP (OUTPATIENT)
Dept: GASTROENTEROLOGY | Facility: CLINIC | Age: 60
End: 2025-06-09

## 2025-07-02 DIAGNOSIS — F51.01 PRIMARY INSOMNIA: ICD-10-CM

## 2025-07-03 RX ORDER — ZOLPIDEM TARTRATE 10 MG/1
10 TABLET ORAL
Qty: 30 TABLET | Refills: 0 | Status: SHIPPED | OUTPATIENT
Start: 2025-07-03

## 2025-07-10 RX ORDER — AMMONIUM LACTATE 12 G/100G
LOTION TOPICAL
COMMUNITY
Start: 2025-06-19

## 2025-07-15 ENCOUNTER — OFFICE VISIT (OUTPATIENT)
Dept: FAMILY MEDICINE CLINIC | Facility: CLINIC | Age: 60
End: 2025-07-15
Payer: COMMERCIAL

## 2025-07-15 VITALS
WEIGHT: 194.8 LBS | TEMPERATURE: 97.8 F | OXYGEN SATURATION: 100 % | RESPIRATION RATE: 14 BRPM | SYSTOLIC BLOOD PRESSURE: 100 MMHG | BODY MASS INDEX: 23 KG/M2 | HEIGHT: 77 IN | DIASTOLIC BLOOD PRESSURE: 66 MMHG | HEART RATE: 68 BPM

## 2025-07-15 DIAGNOSIS — I10 PRIMARY HYPERTENSION: ICD-10-CM

## 2025-07-15 DIAGNOSIS — D50.8 IRON DEFICIENCY ANEMIA SECONDARY TO INADEQUATE DIETARY IRON INTAKE: ICD-10-CM

## 2025-07-15 DIAGNOSIS — F51.01 PRIMARY INSOMNIA: Primary | ICD-10-CM

## 2025-07-15 DIAGNOSIS — Z00.00 HEALTHCARE MAINTENANCE: ICD-10-CM

## 2025-07-15 DIAGNOSIS — E78.2 MIXED HYPERLIPIDEMIA: ICD-10-CM

## 2025-07-15 DIAGNOSIS — E55.9 HYPOVITAMINOSIS D: ICD-10-CM

## 2025-07-15 PROCEDURE — 99214 OFFICE O/P EST MOD 30 MIN: CPT | Performed by: NURSE PRACTITIONER

## 2025-07-15 RX ORDER — ESZOPICLONE 2 MG/1
2 TABLET, FILM COATED ORAL
Qty: 30 TABLET | Refills: 1 | Status: SHIPPED | OUTPATIENT
Start: 2025-07-15 | End: 2025-08-01 | Stop reason: DRUGHIGH

## 2025-07-15 RX ORDER — METOPROLOL TARTRATE 50 MG
1 TABLET ORAL 2 TIMES DAILY
COMMUNITY
Start: 2025-07-06

## 2025-07-16 ENCOUNTER — APPOINTMENT (OUTPATIENT)
Dept: LAB | Facility: CLINIC | Age: 60
End: 2025-07-16
Payer: COMMERCIAL

## 2025-07-16 LAB
25(OH)D3 SERPL-MCNC: 8.4 NG/ML (ref 30–100)
ALBUMIN SERPL BCG-MCNC: 4 G/DL (ref 3.5–5)
ALP SERPL-CCNC: 218 U/L (ref 34–104)
ALT SERPL W P-5'-P-CCNC: 18 U/L (ref 7–52)
ANION GAP SERPL CALCULATED.3IONS-SCNC: 10 MMOL/L (ref 4–13)
AST SERPL W P-5'-P-CCNC: 23 U/L (ref 13–39)
BASOPHILS # BLD AUTO: 0.03 THOUSANDS/ÂΜL (ref 0–0.1)
BASOPHILS NFR BLD AUTO: 1 % (ref 0–1)
BILIRUB SERPL-MCNC: 1.38 MG/DL (ref 0.2–1)
BUN SERPL-MCNC: 13 MG/DL (ref 5–25)
CALCIUM SERPL-MCNC: 8.2 MG/DL (ref 8.4–10.2)
CHLORIDE SERPL-SCNC: 112 MMOL/L (ref 96–108)
CHOLEST SERPL-MCNC: 76 MG/DL (ref ?–200)
CO2 SERPL-SCNC: 24 MMOL/L (ref 21–32)
CREAT SERPL-MCNC: 1.03 MG/DL (ref 0.6–1.3)
CREAT UR-MCNC: 99.4 MG/DL
EOSINOPHIL # BLD AUTO: 0.2 THOUSAND/ÂΜL (ref 0–0.61)
EOSINOPHIL NFR BLD AUTO: 5 % (ref 0–6)
ERYTHROCYTE [DISTWIDTH] IN BLOOD BY AUTOMATED COUNT: 15.2 % (ref 11.6–15.1)
FERRITIN SERPL-MCNC: 17 NG/ML (ref 30–336)
GFR SERPL CREATININE-BSD FRML MDRD: 79 ML/MIN/1.73SQ M
GLUCOSE P FAST SERPL-MCNC: 96 MG/DL (ref 65–99)
HCT VFR BLD AUTO: 36.3 % (ref 36.5–49.3)
HDLC SERPL-MCNC: 49 MG/DL
HGB BLD-MCNC: 11.8 G/DL (ref 12–17)
IMM GRANULOCYTES # BLD AUTO: 0.01 THOUSAND/UL (ref 0–0.2)
IMM GRANULOCYTES NFR BLD AUTO: 0 % (ref 0–2)
IRON SATN MFR SERPL: 25 % (ref 15–50)
IRON SERPL-MCNC: 88 UG/DL (ref 50–212)
LDLC SERPL CALC-MCNC: 20 MG/DL (ref 0–100)
LYMPHOCYTES # BLD AUTO: 1.16 THOUSANDS/ÂΜL (ref 0.6–4.47)
LYMPHOCYTES NFR BLD AUTO: 30 % (ref 14–44)
MCH RBC QN AUTO: 29.1 PG (ref 26.8–34.3)
MCHC RBC AUTO-ENTMCNC: 32.5 G/DL (ref 31.4–37.4)
MCV RBC AUTO: 90 FL (ref 82–98)
MICROALBUMIN UR-MCNC: <7 MG/L
MONOCYTES # BLD AUTO: 0.37 THOUSAND/ÂΜL (ref 0.17–1.22)
MONOCYTES NFR BLD AUTO: 10 % (ref 4–12)
NEUTROPHILS # BLD AUTO: 2.12 THOUSANDS/ÂΜL (ref 1.85–7.62)
NEUTS SEG NFR BLD AUTO: 54 % (ref 43–75)
NONHDLC SERPL-MCNC: 27 MG/DL
NRBC BLD AUTO-RTO: 0 /100 WBCS
PLATELET # BLD AUTO: 160 THOUSANDS/UL (ref 149–390)
PMV BLD AUTO: 11 FL (ref 8.9–12.7)
POTASSIUM SERPL-SCNC: 4.5 MMOL/L (ref 3.5–5.3)
PROT SERPL-MCNC: 6.5 G/DL (ref 6.4–8.4)
RBC # BLD AUTO: 4.05 MILLION/UL (ref 3.88–5.62)
SODIUM SERPL-SCNC: 146 MMOL/L (ref 135–147)
TIBC SERPL-MCNC: 345.8 UG/DL (ref 250–450)
TRANSFERRIN SERPL-MCNC: 247 MG/DL (ref 203–362)
TRIGL SERPL-MCNC: 33 MG/DL (ref ?–150)
TSH SERPL DL<=0.05 MIU/L-ACNC: 1.11 UIU/ML (ref 0.45–4.5)
UIBC SERPL-MCNC: 258 UG/DL (ref 155–355)
WBC # BLD AUTO: 3.89 THOUSAND/UL (ref 4.31–10.16)

## 2025-07-16 PROCEDURE — 82570 ASSAY OF URINE CREATININE: CPT | Performed by: NURSE PRACTITIONER

## 2025-07-16 PROCEDURE — 82306 VITAMIN D 25 HYDROXY: CPT | Performed by: NURSE PRACTITIONER

## 2025-07-16 PROCEDURE — 80061 LIPID PANEL: CPT | Performed by: NURSE PRACTITIONER

## 2025-07-16 PROCEDURE — 80053 COMPREHEN METABOLIC PANEL: CPT | Performed by: NURSE PRACTITIONER

## 2025-07-16 PROCEDURE — 82728 ASSAY OF FERRITIN: CPT | Performed by: NURSE PRACTITIONER

## 2025-07-16 PROCEDURE — 82043 UR ALBUMIN QUANTITATIVE: CPT | Performed by: NURSE PRACTITIONER

## 2025-07-16 PROCEDURE — 85025 COMPLETE CBC W/AUTO DIFF WBC: CPT | Performed by: NURSE PRACTITIONER

## 2025-07-16 PROCEDURE — 84443 ASSAY THYROID STIM HORMONE: CPT | Performed by: NURSE PRACTITIONER

## 2025-07-16 PROCEDURE — 36415 COLL VENOUS BLD VENIPUNCTURE: CPT | Performed by: NURSE PRACTITIONER

## 2025-07-16 PROCEDURE — 83540 ASSAY OF IRON: CPT | Performed by: NURSE PRACTITIONER

## 2025-07-16 PROCEDURE — 83550 IRON BINDING TEST: CPT | Performed by: NURSE PRACTITIONER

## 2025-07-29 ENCOUNTER — OFFICE VISIT (OUTPATIENT)
Dept: FAMILY MEDICINE CLINIC | Facility: CLINIC | Age: 60
End: 2025-07-29
Payer: COMMERCIAL

## 2025-07-29 VITALS
BODY MASS INDEX: 23.14 KG/M2 | RESPIRATION RATE: 14 BRPM | HEART RATE: 78 BPM | SYSTOLIC BLOOD PRESSURE: 104 MMHG | WEIGHT: 196 LBS | TEMPERATURE: 98 F | DIASTOLIC BLOOD PRESSURE: 80 MMHG | HEIGHT: 77 IN | OXYGEN SATURATION: 99 %

## 2025-07-29 DIAGNOSIS — I10 PRIMARY HYPERTENSION: ICD-10-CM

## 2025-07-29 DIAGNOSIS — D50.8 IRON DEFICIENCY ANEMIA SECONDARY TO INADEQUATE DIETARY IRON INTAKE: Primary | ICD-10-CM

## 2025-07-29 DIAGNOSIS — I48.20 CHRONIC ATRIAL FIBRILLATION (HCC): ICD-10-CM

## 2025-07-29 DIAGNOSIS — E55.9 HYPOVITAMINOSIS D: ICD-10-CM

## 2025-07-29 PROCEDURE — 99214 OFFICE O/P EST MOD 30 MIN: CPT | Performed by: NURSE PRACTITIONER

## 2025-07-29 RX ORDER — SODIUM CHLORIDE 9 MG/ML
20 INJECTION, SOLUTION INTRAVENOUS ONCE
OUTPATIENT
Start: 2025-08-04

## 2025-08-01 DIAGNOSIS — F51.01 PRIMARY INSOMNIA: Primary | ICD-10-CM

## 2025-08-01 DIAGNOSIS — I10 PRIMARY HYPERTENSION: ICD-10-CM

## 2025-08-01 RX ORDER — ESZOPICLONE 3 MG/1
3 TABLET, FILM COATED ORAL
Qty: 30 TABLET | Refills: 0 | Status: SHIPPED | OUTPATIENT
Start: 2025-08-01

## 2025-08-01 RX ORDER — METOPROLOL TARTRATE 25 MG/1
25 TABLET, FILM COATED ORAL 2 TIMES DAILY
Qty: 180 TABLET | Refills: 1 | Status: SHIPPED | OUTPATIENT
Start: 2025-08-01

## (undated) DEVICE — LIGHT HANDLE COVER SLEEVE DISP BLUE STELLAR

## (undated) DEVICE — SUT VICRYL 0 UR-6 27 IN J603H

## (undated) DEVICE — SUTURING DEVICE: Brand: ENDO STITCH

## (undated) DEVICE — 3M™ STERI-STRIP™ REINFORCED ADHESIVE SKIN CLOSURES, R1547, 1/2 IN X 4 IN (12 MM X 100 MM), 6 STRIPS/ENVELOPE: Brand: 3M™ STERI-STRIP™

## (undated) DEVICE — GAUZE SPONGES,8 PLY: Brand: CURITY

## (undated) DEVICE — INTENDED FOR TISSUE SEPARATION, AND OTHER PROCEDURES THAT REQUIRE A SHARP SURGICAL BLADE TO PUNCTURE OR CUT.: Brand: BARD-PARKER SAFETY BLADES SIZE 15, STERILE

## (undated) DEVICE — 4-PORT MANIFOLD: Brand: NEPTUNE 2

## (undated) DEVICE — TROCAR: Brand: KII FIOS FIRST ENTRY

## (undated) DEVICE — GLOVE SRG BIOGEL 7

## (undated) DEVICE — ALLENTOWN LAP CHOLE APP PACK: Brand: CARDINAL HEALTH

## (undated) DEVICE — [HIGH FLOW INSUFFLATOR,  DO NOT USE IF PACKAGE IS DAMAGED,  KEEP DRY,  KEEP AWAY FROM SUNLIGHT,  PROTECT FROM HEAT AND RADIOACTIVE SOURCES.]: Brand: PNEUMOSURE

## (undated) DEVICE — 3M™ TEGADERM™ TRANSPARENT FILM DRESSING FRAME STYLE, 1624W, 2-3/8 IN X 2-3/4 IN (6 CM X 7 CM), 100/CT 4CT/CASE: Brand: 3M™ TEGADERM™

## (undated) DEVICE — GLOVE INDICATOR PI UNDERGLOVE SZ 7 BLUE

## (undated) DEVICE — NEPTUNE E-SEP SMOKE EVACUATION PENCIL, COATED, 70MM BLADE, PUSH BUTTON SWITCH: Brand: NEPTUNE E-SEP

## (undated) DEVICE — PAD GROUNDING ADULT

## (undated) DEVICE — CHLORAPREP HI-LITE 26ML ORANGE

## (undated) DEVICE — 3M™ STERI-STRIP™ REINFORCED ADHESIVE SKIN CLOSURES, R1546, 1/4 IN X 4 IN (6 MM X 100 MM), 10 STRIPS/ENVELOPE: Brand: 3M™ STERI-STRIP™

## (undated) DEVICE — DRAPE EQUIPMENT RF WAND

## (undated) DEVICE — COTTON TIP APPLICTOR 2 PK

## (undated) DEVICE — SUT VLOC 180  0 8IN  VLOCA008L

## (undated) DEVICE — TUBING SMOKE EVAC W/FILTRATION DEVICE PLUMEPORT ACTIV

## (undated) DEVICE — SUT MONOCRYL 4-0 PS-2 18 IN Y496G

## (undated) DEVICE — SCD SEQUENTIAL COMPRESSION COMFORT SLEEVE MEDIUM KNEE LENGTH: Brand: KENDALL SCD

## (undated) DEVICE — ELECTRODE LAP L WIRE E-Z CLEAN 33CM -0100

## (undated) DEVICE — TOWEL SURG XR DETECT GREEN STRL RFD